# Patient Record
Sex: FEMALE | Race: BLACK OR AFRICAN AMERICAN | Employment: OTHER | URBAN - METROPOLITAN AREA
[De-identification: names, ages, dates, MRNs, and addresses within clinical notes are randomized per-mention and may not be internally consistent; named-entity substitution may affect disease eponyms.]

---

## 2018-06-06 ENCOUNTER — TELEPHONE (OUTPATIENT)
Dept: NEUROLOGY | Age: 63
End: 2018-06-06

## 2018-06-06 ENCOUNTER — OFFICE VISIT (OUTPATIENT)
Dept: NEUROLOGY | Age: 63
End: 2018-06-06
Payer: MEDICARE

## 2018-06-06 VITALS
SYSTOLIC BLOOD PRESSURE: 115 MMHG | OXYGEN SATURATION: 98 % | WEIGHT: 211 LBS | HEIGHT: 67 IN | DIASTOLIC BLOOD PRESSURE: 78 MMHG | HEART RATE: 77 BPM | BODY MASS INDEX: 33.12 KG/M2

## 2018-06-06 DIAGNOSIS — S09.90XA INJURY OF HEAD, INITIAL ENCOUNTER: ICD-10-CM

## 2018-06-06 DIAGNOSIS — G43.009 MIGRAINE WITHOUT AURA AND WITHOUT STATUS MIGRAINOSUS, NOT INTRACTABLE: Primary | ICD-10-CM

## 2018-06-06 DIAGNOSIS — G50.1 ATYPICAL FACIAL PAIN: ICD-10-CM

## 2018-06-06 PROCEDURE — 99214 OFFICE O/P EST MOD 30 MIN: CPT | Performed by: CLINICAL NURSE SPECIALIST

## 2018-06-06 RX ORDER — LORAZEPAM 0.5 MG/1
TABLET ORAL PRN
Refills: 1 | COMMUNITY
Start: 2018-05-15

## 2018-06-06 RX ORDER — PREGABALIN 200 MG/1
200 CAPSULE ORAL 2 TIMES DAILY
Qty: 60 CAPSULE | Refills: 5 | Status: SHIPPED | OUTPATIENT
Start: 2018-06-06 | End: 2018-06-08 | Stop reason: SDUPTHER

## 2018-06-06 RX ORDER — SUMATRIPTAN 100 MG/1
100 TABLET, FILM COATED ORAL
Qty: 6 TABLET | Refills: 2 | Status: SHIPPED | OUTPATIENT
Start: 2018-06-06 | End: 2020-07-22 | Stop reason: SDUPTHER

## 2018-06-08 DIAGNOSIS — S09.90XA INJURY OF HEAD, INITIAL ENCOUNTER: ICD-10-CM

## 2018-06-08 DIAGNOSIS — G50.1 ATYPICAL FACIAL PAIN: ICD-10-CM

## 2018-06-08 RX ORDER — PREGABALIN 200 MG/1
200 CAPSULE ORAL 2 TIMES DAILY
Qty: 60 CAPSULE | Refills: 5 | Status: SHIPPED | OUTPATIENT
Start: 2018-06-08 | End: 2018-09-20 | Stop reason: SDUPTHER

## 2018-09-20 ENCOUNTER — OFFICE VISIT (OUTPATIENT)
Dept: NEUROLOGY | Age: 63
End: 2018-09-20
Payer: MEDICARE

## 2018-09-20 VITALS
SYSTOLIC BLOOD PRESSURE: 113 MMHG | OXYGEN SATURATION: 97 % | TEMPERATURE: 98 F | DIASTOLIC BLOOD PRESSURE: 70 MMHG | HEIGHT: 67 IN | HEART RATE: 75 BPM | BODY MASS INDEX: 35.06 KG/M2 | WEIGHT: 223.4 LBS

## 2018-09-20 DIAGNOSIS — G50.1 ATYPICAL FACIAL PAIN: Primary | ICD-10-CM

## 2018-09-20 DIAGNOSIS — G43.009 MIGRAINE WITHOUT AURA AND WITHOUT STATUS MIGRAINOSUS, NOT INTRACTABLE: ICD-10-CM

## 2018-09-20 PROCEDURE — 99214 OFFICE O/P EST MOD 30 MIN: CPT | Performed by: CLINICAL NURSE SPECIALIST

## 2018-09-20 RX ORDER — PREGABALIN 200 MG/1
200 CAPSULE ORAL 2 TIMES DAILY
Qty: 60 CAPSULE | Refills: 2 | Status: SHIPPED | OUTPATIENT
Start: 2018-09-20 | End: 2019-02-19 | Stop reason: SDUPTHER

## 2018-12-18 ENCOUNTER — TELEPHONE (OUTPATIENT)
Dept: NEUROLOGY | Age: 63
End: 2018-12-18

## 2018-12-18 RX ORDER — BACLOFEN 10 MG/1
10 TABLET ORAL 2 TIMES DAILY
Qty: 60 TABLET | Refills: 3 | Status: CANCELLED | OUTPATIENT
Start: 2018-12-18

## 2018-12-18 NOTE — TELEPHONE ENCOUNTER
Patient called in stating that Moustapha Alexandra treats her for trigeminal neuralgia and she takes Lyrica 200 mg BID. She states that the Lyrica is not working like it has been. She has been taking it for over 5 years. She is starting to feel more pain. She states that She feels like someone punched her in her ear and it radiates. She stated that it starts at her temple and radiates to the front of her ear, goes into her jaw line and into her chin. She states that she wants to get a handle on it before it gets worse. She stated that she really didn't want to go to the ER for this. MA explained to her that Jon Will is out until 12/27/2018 but that this would be forwarded to Dr. Roseann Monahan for immediate advisement. She can be reached at [phone].

## 2018-12-20 RX ORDER — DOXEPIN HYDROCHLORIDE 10 MG/1
10 CAPSULE ORAL NIGHTLY
Qty: 30 CAPSULE | Refills: 5 | Status: SHIPPED | OUTPATIENT
Start: 2018-12-20 | End: 2019-06-24 | Stop reason: SDUPTHER

## 2018-12-20 NOTE — TELEPHONE ENCOUNTER
Patient would like to try the doxepin. Order just needs to be signed. This will be going to [facility] on [address].  Thank you!!

## 2019-02-19 DIAGNOSIS — G50.1 ATYPICAL FACIAL PAIN: ICD-10-CM

## 2019-02-19 RX ORDER — PREGABALIN 200 MG/1
200 CAPSULE ORAL 2 TIMES DAILY
Qty: 60 CAPSULE | Refills: 2 | Status: SHIPPED | OUTPATIENT
Start: 2019-02-19 | End: 2019-04-23 | Stop reason: SDUPTHER

## 2019-02-27 ENCOUNTER — TELEPHONE (OUTPATIENT)
Dept: NEUROLOGY | Age: 64
End: 2019-02-27

## 2019-03-25 ENCOUNTER — OFFICE VISIT (OUTPATIENT)
Dept: NEUROLOGY | Age: 64
End: 2019-03-25
Payer: MEDICARE

## 2019-03-25 VITALS
BODY MASS INDEX: 35.01 KG/M2 | HEART RATE: 85 BPM | WEIGHT: 231 LBS | RESPIRATION RATE: 18 BRPM | SYSTOLIC BLOOD PRESSURE: 126 MMHG | DIASTOLIC BLOOD PRESSURE: 82 MMHG | OXYGEN SATURATION: 99 % | HEIGHT: 68 IN

## 2019-03-25 DIAGNOSIS — I65.23 BILATERAL CAROTID ARTERY STENOSIS: ICD-10-CM

## 2019-03-25 DIAGNOSIS — G08 CEREBRAL VENOUS THROMBOSIS: ICD-10-CM

## 2019-03-25 DIAGNOSIS — G50.1 ATYPICAL FACIAL PAIN: Primary | ICD-10-CM

## 2019-03-25 PROCEDURE — 99214 OFFICE O/P EST MOD 30 MIN: CPT | Performed by: CLINICAL NURSE SPECIALIST

## 2019-03-28 DIAGNOSIS — I65.23 BILATERAL CAROTID ARTERY STENOSIS: ICD-10-CM

## 2019-04-23 DIAGNOSIS — G50.1 ATYPICAL FACIAL PAIN: ICD-10-CM

## 2019-04-23 RX ORDER — PREGABALIN 200 MG/1
200 CAPSULE ORAL 2 TIMES DAILY
Qty: 60 CAPSULE | Refills: 5 | Status: SHIPPED | OUTPATIENT
Start: 2019-04-23 | End: 2019-08-27 | Stop reason: SDUPTHER

## 2019-06-24 RX ORDER — DOXEPIN HYDROCHLORIDE 10 MG/1
CAPSULE ORAL
Qty: 30 CAPSULE | Refills: 5 | Status: SHIPPED | OUTPATIENT
Start: 2019-06-24 | End: 2020-01-27 | Stop reason: SDUPTHER

## 2019-08-27 ENCOUNTER — TELEPHONE (OUTPATIENT)
Dept: NEUROLOGY | Age: 64
End: 2019-08-27

## 2019-08-27 DIAGNOSIS — G50.1 ATYPICAL FACIAL PAIN: ICD-10-CM

## 2019-08-27 RX ORDER — PREGABALIN 200 MG/1
200 CAPSULE ORAL 2 TIMES DAILY
Qty: 60 CAPSULE | Refills: 5 | Status: SHIPPED | OUTPATIENT
Start: 2019-08-27 | End: 2019-09-26 | Stop reason: SDUPTHER

## 2019-08-27 NOTE — TELEPHONE ENCOUNTER
Per Ace patient had a script from [facility] with 5 refills. This was picked up on 8/26 with 1 refill left.   Electronically signed by Ethan Feng on 8/27/19 at 9:47 AM

## 2019-09-26 DIAGNOSIS — G50.1 ATYPICAL FACIAL PAIN: ICD-10-CM

## 2019-09-26 RX ORDER — PREGABALIN 200 MG/1
CAPSULE ORAL
Qty: 60 CAPSULE | Refills: 2 | Status: SHIPPED | OUTPATIENT
Start: 2019-09-26 | End: 2019-10-02 | Stop reason: SDUPTHER

## 2019-10-02 ENCOUNTER — OFFICE VISIT (OUTPATIENT)
Dept: NEUROLOGY | Age: 64
End: 2019-10-02
Payer: MEDICARE

## 2019-10-02 VITALS
HEIGHT: 68 IN | RESPIRATION RATE: 18 BRPM | BODY MASS INDEX: 33.45 KG/M2 | HEART RATE: 73 BPM | OXYGEN SATURATION: 98 % | DIASTOLIC BLOOD PRESSURE: 67 MMHG | SYSTOLIC BLOOD PRESSURE: 117 MMHG | WEIGHT: 220.7 LBS

## 2019-10-02 DIAGNOSIS — G50.1 ATYPICAL FACIAL PAIN: Primary | ICD-10-CM

## 2019-10-02 PROCEDURE — 99214 OFFICE O/P EST MOD 30 MIN: CPT | Performed by: CLINICAL NURSE SPECIALIST

## 2019-10-02 RX ORDER — PREGABALIN 200 MG/1
CAPSULE ORAL
Qty: 60 CAPSULE | Refills: 2 | Status: SHIPPED | OUTPATIENT
Start: 2019-10-02 | End: 2020-04-08 | Stop reason: SDUPTHER

## 2020-01-22 ENCOUNTER — TELEPHONE (OUTPATIENT)
Dept: NEUROLOGY | Age: 65
End: 2020-01-22

## 2020-01-22 NOTE — TELEPHONE ENCOUNTER
ANÍBAL to call and set up appointment w/[facility] in [place] for April  Electronically signed by Rick Khan on 1/22/20 at 10:29 AM

## 2020-01-27 RX ORDER — DOXEPIN HYDROCHLORIDE 10 MG/1
CAPSULE ORAL
Qty: 30 CAPSULE | Refills: 5 | Status: SHIPPED | OUTPATIENT
Start: 2020-01-27 | End: 2020-07-22 | Stop reason: SDUPTHER

## 2020-04-08 ENCOUNTER — VIRTUAL VISIT (OUTPATIENT)
Dept: NEUROLOGY | Age: 65
End: 2020-04-08
Payer: MEDICARE

## 2020-04-08 PROCEDURE — 99443 PR PHYS/QHP TELEPHONE EVALUATION 21-30 MIN: CPT | Performed by: CLINICAL NURSE SPECIALIST

## 2020-04-08 RX ORDER — ATORVASTATIN CALCIUM 40 MG/1
1 TABLET, FILM COATED ORAL DAILY
COMMUNITY
Start: 2020-04-05

## 2020-04-08 RX ORDER — PREGABALIN 200 MG/1
CAPSULE ORAL
Qty: 60 CAPSULE | Refills: 2 | Status: SHIPPED | OUTPATIENT
Start: 2020-04-08 | End: 2020-07-22 | Stop reason: SDUPTHER

## 2020-04-08 NOTE — PROGRESS NOTES
Tati Guerra was read the following message We want to confirm that, for purposes of billing, this is a virtual visit with your provider for which we will submit a claim for reimbursement with your insurance company. You will be responsible for any copays, coinsurance amounts or other amounts not covered by your insurance company. If you do not accept this, unfortunately we will not be able to schedule a virtual visit with the provider. Do you accept? Helga responded Yes .

## 2020-04-08 NOTE — PROGRESS NOTES
Magen Garcia is a 60 y.o. female evaluated via telephone on 4/8/2020. Consent:  She and/or health care decision maker is aware that that she may receive a bill for this telephone service, depending on her insurance coverage, and has provided verbal consent to proceed: Yes    I affirm this is a Patient Initiated Episode with an Established Patient who has not had a related appointment within my department in the past 7 days or scheduled within the next 24 hours. Total Time: minutes: 21-30 minutes      Patient advised regarding steps to help prevent the spread of COVID-19   SOURCE - [email or web address].[email or web address] html     1-Stay home except to get medical care  2-Clean your hands often for atleast 20 secnds, avoid touching: Avoid touching your eyes, nose, and mouth with unwashed hands. 3-Seek medical attention: Seek prompt medical attention if your illness is worsening (e.g., difficulty breathing).   Call you doctor first.  3-Wear a facemask if you are sick   4-Cover your coughs and sneezes          Note: not billable if this call serves to triage the patient into an appointment for the relevant concern  Date of Telephone Visit:   4/8/20     Consent:  The patient and/or health care decision maker is aware that that he may receive a bill for this telephone service, depending on his insurance coverage, and has provided verbal consent to proceed: Yes    Long history of atypical facial pain following a bout with trigeminal neuralgia   A presumed infection triggering her issues   Tried gabapentin and baclofen without improvement   Responded to Lyrica at high doses quite well and recently, Doxepin (per Dr Rubia Luis)      In June 2016 she had the flu for about 3 days   Shortly afterwards, developed severe headache -- seen in ER, diagnosed with CVT   Transferred to [facility] -- underwent clot retrieval and recovered well    Felt she was severely dehydrated from influenza    Left with short-term memory deficits and now no longer working at the [employer]     denies visual issues - no double vision or loss of vision   No nausea or vomiting    Remains off antiplatelets or AC   Was previously started on warfarin -- developed bleeding and it was discontinued by [facility]    Now on daily baby Aspirin     No chest pain or palpitations  No SOB  No vertigo, lightheadedness or loss of consciousness  No falls, tripping or stumbling  No incontinence of bowels or bladder  No itching or bruising appreciated  No numbness, tingling or focal arm/leg weakness    ROS otherwise negative     Prior to Visit Medications    Medication Sig Taking? Authorizing Provider   doxepin (SINEQUAN) 10 MG capsule TAKE 1 CAPSULE BY MOUTH EVERY NIGHT  TERENCE Zapata   pregabalin (LYRICA) 200 MG capsule TAKE 1 CAPSULE BY MOUTH TWICE DAILY  TERENCE Zapata   LORazepam (ATIVAN) 0.5 MG tablet TK 1 T PO TID PRF ANXIETY  Historical Provider, MD   SUMAtriptan (IMITREX) 100 MG tablet Take 1 tablet by mouth once as needed for Migraine  TERENCE Zapata   pseudoephedrine (SUDAFED) 30 MG tablet Take 30 mg by mouth every 4 hours as needed for Congestion  Historical Provider, MD   aspirin 81 MG tablet Take 81 mg by mouth daily  Historical Provider, MD JACOBO VITAMIN D-3 2000 UNITS CAPS 1 capsule daily   Historical Provider, MD   DYMISTA 137-50 MCG/ACT SUSP as needed   Historical Provider, MD   sucralfate (CARAFATE) 1 GM tablet   Take 1 g by mouth as needed   Historical Provider, MD   furosemide (LASIX) 40 MG tablet Take 40 mg by mouth as needed.     Historical Provider, MD     Allergies as of 04/08/2020    (No Known Allergies)     Objective:     Mental Status: alert and oriented to person place and time    Speech: clear  Language: normal with no anomia today       Laboratory/Radiology:     CBC:   Lab Results   Component Value Date    WBC 6.4 11/10/2016    RBC 5.09 11/10/2016    HGB 13.6 11/10/2016    HCT 42.1 11/10/2016    MCV 82.9 11/10/2016    MCH 26.8 11/10/2016    MCHC 32.4 11/10/2016    RDW 15.8 11/10/2016     11/10/2016    MPV 9.5 11/10/2016     BMP:    Lab Results   Component Value Date     11/10/2016    K 4.3 11/10/2016     11/10/2016    CO2 21 11/10/2016    BUN 9 11/10/2016    LABALBU 4.2 11/10/2016    LABALBU 4.0 04/27/2011    CREATININE 0.8 11/10/2016    CALCIUM 9.1 11/10/2016    GFRAA >60 11/10/2016    LABGLOM >60 11/10/2016    GLUCOSE 85 11/10/2016    GLUCOSE 80 04/27/2011     CT Head June 2018    Unremarkable     Carotid US March 2019   No significant stenosis     Labs and dx test personally reviewed at this appt     Assessment:     Atypical facial pain   Improved with Lyrica at current dosage and Doxepin     Gabapentin and baclofen was not helpful in the past    Migraine headache -- rare    Sleep deprivation   May need to consider r/o CYNDEE in future - states previous sleep studies unremarkable     clot retrieval for CVT   Doing well neurologically with minimal cognitive, word-finding issues     Plan:     Continue Lyrica 200 BID and Doxepin at 10mg daily   New script provided     I spent 25 minutes with the patient, with 50% or more counseling them on their diagnosis and treatment. Patient advised regarding steps to help prevent the spread of COVID-19   SOURCE - https://heriberto-amie.info/. html     1-Stay home except to get medical care  2-Clean your hands often for atleast 20 secnds, avoid touching: Avoid touching your eyes, nose, and mouth with unwashed hands. 3-Seek medical attention: Seek prompt medical attention if your illness is worsening (e.g., difficulty breathing).   Call you doctor first.  3-Wear a facemask if you are sick   4-Cover your coughs and sneezes        I affirm this is a Patient Initiated Episode with an Established Patient who has not had a related appointment within my department in the past 7 days or scheduled within the

## 2020-07-22 RX ORDER — PREGABALIN 200 MG/1
CAPSULE ORAL
Qty: 60 CAPSULE | Refills: 5 | Status: SHIPPED
Start: 2020-07-22 | End: 2020-08-18 | Stop reason: SDUPTHER

## 2020-07-22 RX ORDER — SUMATRIPTAN 100 MG/1
100 TABLET, FILM COATED ORAL
Qty: 6 TABLET | Refills: 2 | Status: SHIPPED
Start: 2020-07-22 | End: 2021-08-25

## 2020-07-22 RX ORDER — DOXEPIN HYDROCHLORIDE 10 MG/1
CAPSULE ORAL
Qty: 30 CAPSULE | Refills: 5 | Status: SHIPPED
Start: 2020-07-22 | End: 2021-01-04

## 2020-07-26 ENCOUNTER — HOSPITAL ENCOUNTER (INPATIENT)
Age: 65
LOS: 2 days | Discharge: HOME | DRG: 292 | End: 2020-07-28
Attending: EMERGENCY MEDICINE | Admitting: FAMILY MEDICINE
Payer: MEDICARE

## 2020-07-26 ENCOUNTER — APPOINTMENT (OUTPATIENT)
Dept: GENERAL RADIOLOGY | Age: 65
DRG: 292 | End: 2020-07-26
Payer: MEDICARE

## 2020-07-26 PROBLEM — J44.9 COPD (CHRONIC OBSTRUCTIVE PULMONARY DISEASE) (HCC): Chronic | Status: ACTIVE | Noted: 2020-07-26

## 2020-07-26 PROBLEM — R06.00 DYSPNEA: Status: ACTIVE | Noted: 2020-07-26

## 2020-07-26 PROBLEM — I50.9 ACUTE CONGESTIVE HEART FAILURE (HCC): Status: ACTIVE | Noted: 2020-07-26

## 2020-07-26 PROBLEM — E78.5 HYPERLIPEMIA: Chronic | Status: ACTIVE | Noted: 2020-07-26

## 2020-07-26 LAB
ANION GAP SERPL CALCULATED.3IONS-SCNC: 12 MMOL/L (ref 7–16)
BASOPHILS ABSOLUTE: 0 E9/L (ref 0–0.2)
BASOPHILS RELATIVE PERCENT: 0 % (ref 0–2)
BUN BLDV-MCNC: 11 MG/DL (ref 8–23)
CALCIUM SERPL-MCNC: 9.5 MG/DL (ref 8.6–10.2)
CHLORIDE BLD-SCNC: 105 MMOL/L (ref 98–107)
CO2: 26 MMOL/L (ref 22–29)
CREAT SERPL-MCNC: 0.6 MG/DL (ref 0.5–1)
EOSINOPHILS ABSOLUTE: 0 E9/L (ref 0.05–0.5)
EOSINOPHILS RELATIVE PERCENT: 0 % (ref 0–6)
GFR AFRICAN AMERICAN: >60
GFR NON-AFRICAN AMERICAN: >60 ML/MIN/1.73
GLUCOSE BLD-MCNC: 119 MG/DL (ref 74–99)
HCT VFR BLD CALC: 41.6 % (ref 34–48)
HEMOGLOBIN: 12.7 G/DL (ref 11.5–15.5)
IMMATURE GRANULOCYTES #: 0.01 E9/L
IMMATURE GRANULOCYTES %: 0.2 % (ref 0–5)
LYMPHOCYTES ABSOLUTE: 1.07 E9/L (ref 1.5–4)
LYMPHOCYTES RELATIVE PERCENT: 18.5 % (ref 20–42)
MCH RBC QN AUTO: 25.7 PG (ref 26–35)
MCHC RBC AUTO-ENTMCNC: 30.5 % (ref 32–34.5)
MCV RBC AUTO: 84 FL (ref 80–99.9)
MONOCYTES ABSOLUTE: 0.32 E9/L (ref 0.1–0.95)
MONOCYTES RELATIVE PERCENT: 5.5 % (ref 2–12)
NEUTROPHILS ABSOLUTE: 4.37 E9/L (ref 1.8–7.3)
NEUTROPHILS RELATIVE PERCENT: 75.8 % (ref 43–80)
PDW BLD-RTO: 14.5 FL (ref 11.5–15)
PLATELET # BLD: 252 E9/L (ref 130–450)
PMV BLD AUTO: 11.5 FL (ref 7–12)
POTASSIUM REFLEX MAGNESIUM: 3.7 MMOL/L (ref 3.5–5)
PRO-BNP: 336 PG/ML (ref 0–125)
RBC # BLD: 4.95 E12/L (ref 3.5–5.5)
SARS-COV-2, NAAT: NOT DETECTED
SODIUM BLD-SCNC: 143 MMOL/L (ref 132–146)
TROPONIN: <0.01 NG/ML (ref 0–0.03)
WBC # BLD: 5.8 E9/L (ref 4.5–11.5)

## 2020-07-26 PROCEDURE — 6360000002 HC RX W HCPCS: Performed by: STUDENT IN AN ORGANIZED HEALTH CARE EDUCATION/TRAINING PROGRAM

## 2020-07-26 PROCEDURE — 2060000000 HC ICU INTERMEDIATE R&B

## 2020-07-26 PROCEDURE — 80048 BASIC METABOLIC PNL TOTAL CA: CPT

## 2020-07-26 PROCEDURE — G0378 HOSPITAL OBSERVATION PER HR: HCPCS

## 2020-07-26 PROCEDURE — 96374 THER/PROPH/DIAG INJ IV PUSH: CPT

## 2020-07-26 PROCEDURE — 83880 ASSAY OF NATRIURETIC PEPTIDE: CPT

## 2020-07-26 PROCEDURE — 71045 X-RAY EXAM CHEST 1 VIEW: CPT

## 2020-07-26 PROCEDURE — 85025 COMPLETE CBC W/AUTO DIFF WBC: CPT

## 2020-07-26 PROCEDURE — 93005 ELECTROCARDIOGRAM TRACING: CPT | Performed by: STUDENT IN AN ORGANIZED HEALTH CARE EDUCATION/TRAINING PROGRAM

## 2020-07-26 PROCEDURE — 99285 EMERGENCY DEPT VISIT HI MDM: CPT

## 2020-07-26 PROCEDURE — 94761 N-INVAS EAR/PLS OXIMETRY MLT: CPT

## 2020-07-26 PROCEDURE — U0002 COVID-19 LAB TEST NON-CDC: HCPCS

## 2020-07-26 PROCEDURE — 84484 ASSAY OF TROPONIN QUANT: CPT

## 2020-07-26 RX ORDER — FUROSEMIDE 10 MG/ML
20 INJECTION INTRAMUSCULAR; INTRAVENOUS ONCE
Status: COMPLETED | OUTPATIENT
Start: 2020-07-26 | End: 2020-07-26

## 2020-07-26 RX ADMIN — FUROSEMIDE 20 MG: 10 INJECTION, SOLUTION INTRAVENOUS at 19:36

## 2020-07-26 ASSESSMENT — ENCOUNTER SYMPTOMS
WHEEZING: 0
ABDOMINAL DISTENTION: 0
EYE REDNESS: 0
SHORTNESS OF BREATH: 1
COUGH: 0
SINUS PRESSURE: 0
ABDOMINAL PAIN: 0
SPUTUM PRODUCTION: 0
NAUSEA: 0
EYE PAIN: 0
SORE THROAT: 0
DIARRHEA: 0
VOMITING: 0
EYE DISCHARGE: 0
BACK PAIN: 0

## 2020-07-26 ASSESSMENT — PAIN SCALES - GENERAL: PAINLEVEL_OUTOF10: 0

## 2020-07-26 NOTE — ED NOTES
Spoke with Dr. Dari Verma, patient would like to be updated on plan of care.       Hima Rubin RN  07/26/20 [id number]

## 2020-07-26 NOTE — ED NOTES
Patient was walked around nurses pod 2. Spo2 stated at 96% on RA. And ended at 90% on RA.       Saintclair Rand, LPN  06/16/16 4301

## 2020-07-26 NOTE — ED PROVIDER NOTES
72-year-old female with a history of COPD presents to the emergency department with complaints of shortness of breath. Patient states this is been going on for the past 2 weeks. And that it is worse when she exercises. Patient states she does use an inhaler but it has not helped. Patient will states she has some bilateral swelling to her ankles. Patient states that is been slowly worsening for the past 2 weeks. Denies any fevers cough sore throat denies any chest pain abdominal pain, nausea vomiting, urine or bowel changes. Patient states the shortness of breath does not prevent her from any of her daily activities it is just bothersome. Patient has a past medical history of COPD and high cholesterol. Shortness of Breath   Severity:  Mild  Onset quality:  Gradual  Duration:  2 weeks  Timing:  Intermittent  Progression:  Worsening  Chronicity:  Recurrent  Context: activity    Relieved by:  Nothing  Worsened by: Activity  Ineffective treatments:  Inhaler  Associated symptoms: no abdominal pain, no chest pain, no cough, no diaphoresis, no ear pain, no fever, no headaches, no rash, no sore throat, no sputum production, no vomiting and no wheezing         Review of Systems   Constitutional: Negative for chills, diaphoresis and fever. HENT: Negative for ear pain, sinus pressure and sore throat. Eyes: Negative for pain, discharge and redness. Respiratory: Positive for shortness of breath. Negative for cough, sputum production and wheezing. Cardiovascular: Negative for chest pain. Gastrointestinal: Negative for abdominal distention, abdominal pain, diarrhea, nausea and vomiting. Genitourinary: Negative for dysuria and frequency. Musculoskeletal: Negative for arthralgias and back pain. Skin: Negative for rash and wound. Neurological: Negative for weakness and headaches. Hematological: Negative for adenopathy. All other systems reviewed and are negative.        Physical CPT®: reviewed         ED Course as of Jul 26 2116   Ackerman Jul 26, 2020   1313 Concern usually of the patient's shortness of breath is possible COPD exacerbation versus possible CHF. Will work-up for signs of acute coronary syndrome and CHF and will get chest x-ray to evaluate for possible signs of pneumonia or consolidation    [CB]   1633 Talk to patient about her chest x-ray being unremarkable and labs been unremarkable except for a slight elevation [place]BNP at [address] with patient about doing a walking ambulatory pulse ox check to make sure O2 does not drop and if everything is okay we would be able to discharge her and have her follow-up with her PCP was agreeable with this plan    [CB]   1922 Talked with patient about her ambulatory sat being low and her BNP being elevated and possible CHF patient states she would not be able to follow-up with a cardiologist, I recommend admission for the patient and the patient was agreeable, also ordered 20 of Lasix    [CB]   1930 Talked with Dr. Darlene Kearney on the telephone by admitting the patient he was agreeable    [CB]      ED Course User Index  [CB] Lashanda Guerrier MD        ED Course as of Jul 26 2116   Sun Jul 26, 2020   1313 Concern usually of the patient's shortness of breath is possible COPD exacerbation versus possible CHF.   Will work-up for signs of acute coronary syndrome and CHF and will get chest x-ray to evaluate for possible signs of pneumonia or consolidation    [CB]   1633 Talk to patient about her chest x-ray being unremarkable and labs been unremarkable except for a slight elevation of her proBNP at [address] with patient about doing a walking ambulatory pulse ox check to make sure O2 does not drop and if everything is okay we would be able to discharge her and have her follow-up with her PCP was agreeable with this plan    [CB]   1922 Talked with patient about her ambulatory sat being low and her BNP being elevated and possible CHF patient states she would not be able to follow-up with a cardiologist, I recommend admission for the patient and the patient was agreeable, also ordered 20 of Lasix    [CB]   1930 Talked with Dr. Candida Winston on the telephone by admitting the patient he was agreeable    [CB]      ED Course User Index  [CB] Glory Arango MD       --------------------------------------------- PAST HISTORY ---------------------------------------------  Past Medical History:  has a past medical history of GERD (gastroesophageal reflux disease), History of colonoscopy, and Trigeminal neuralgia. Past Surgical History:  has a past surgical history that includes Dilation & curettage; Cholecystectomy; Tubal ligation; Appendectomy; joint replacement (Left); brain surgery; and Finger trigger release. Social History:  reports that she quit smoking about 16 years ago. She has never used smokeless tobacco. She reports that she does not drink alcohol or use drugs. Family History: family history includes Diabetes in her maternal grandmother; Heart Disease in her father and maternal grandmother; High Cholesterol in her maternal grandmother and mother; Hypertension in her maternal grandmother and mother; Osteoporosis in her maternal grandmother and mother; Stroke in her maternal grandmother; Sudden Death in her mother. The patients home medications have been reviewed. Allergies: Patient has no known allergies.     -------------------------------------------------- RESULTS -------------------------------------------------    LABS:  Results for orders placed or performed during the hospital encounter of 07/26/20   CBC Auto Differential   Result Value Ref Range    WBC 5.8 4.5 - 11.5 E9/L    RBC 4.95 3.50 - 5.50 E12/L    Hemoglobin 12.7 11.5 - 15.5 g/dL    Hematocrit 41.6 34.0 - 48.0 %    MCV 84.0 80.0 - 99.9 fL    MCH 25.7 (L) 26.0 - 35.0 pg    MCHC 30.5 (L) 32.0 - 34.5 %    RDW 14.5 11.5 - 15.0 fL    Platelets 715 763 - 655 E9/L    MPV 11.5 7.0 - 12.0 fL

## 2020-07-27 PROBLEM — R60.9 EDEMA: Status: ACTIVE | Noted: 2020-07-27

## 2020-07-27 LAB
ANION GAP SERPL CALCULATED.3IONS-SCNC: 11 MMOL/L (ref 7–16)
BUN BLDV-MCNC: 10 MG/DL (ref 8–23)
CALCIUM SERPL-MCNC: 9.2 MG/DL (ref 8.6–10.2)
CHLORIDE BLD-SCNC: 108 MMOL/L (ref 98–107)
CO2: 25 MMOL/L (ref 22–29)
CREAT SERPL-MCNC: 0.7 MG/DL (ref 0.5–1)
EKG ATRIAL RATE: 89 BPM
EKG P AXIS: 49 DEGREES
EKG P-R INTERVAL: 132 MS
EKG Q-T INTERVAL: 396 MS
EKG QRS DURATION: 82 MS
EKG QTC CALCULATION (BAZETT): 481 MS
EKG R AXIS: 5 DEGREES
EKG T AXIS: 0 DEGREES
EKG VENTRICULAR RATE: 89 BPM
GFR AFRICAN AMERICAN: >60
GFR NON-AFRICAN AMERICAN: >60 ML/MIN/1.73
GLUCOSE BLD-MCNC: 96 MG/DL (ref 74–99)
LV EF: 68 %
LVEF MODALITY: NORMAL
MAGNESIUM: 1.9 MG/DL (ref 1.6–2.6)
POTASSIUM REFLEX MAGNESIUM: 3.2 MMOL/L (ref 3.5–5)
SODIUM BLD-SCNC: 144 MMOL/L (ref 132–146)

## 2020-07-27 PROCEDURE — 36415 COLL VENOUS BLD VENIPUNCTURE: CPT

## 2020-07-27 PROCEDURE — 2060000000 HC ICU INTERMEDIATE R&B

## 2020-07-27 PROCEDURE — 96372 THER/PROPH/DIAG INJ SC/IM: CPT

## 2020-07-27 PROCEDURE — 6360000004 HC RX CONTRAST MEDICATION: Performed by: FAMILY MEDICINE

## 2020-07-27 PROCEDURE — 94640 AIRWAY INHALATION TREATMENT: CPT

## 2020-07-27 PROCEDURE — APPSS60 APP SPLIT SHARED TIME 46-60 MINUTES: Performed by: PHYSICIAN ASSISTANT

## 2020-07-27 PROCEDURE — 6370000000 HC RX 637 (ALT 250 FOR IP): Performed by: INTERNAL MEDICINE

## 2020-07-27 PROCEDURE — 80048 BASIC METABOLIC PNL TOTAL CA: CPT

## 2020-07-27 PROCEDURE — 99222 1ST HOSP IP/OBS MODERATE 55: CPT | Performed by: INTERNAL MEDICINE

## 2020-07-27 PROCEDURE — 83735 ASSAY OF MAGNESIUM: CPT

## 2020-07-27 PROCEDURE — 6370000000 HC RX 637 (ALT 250 FOR IP): Performed by: FAMILY MEDICINE

## 2020-07-27 PROCEDURE — 93306 TTE W/DOPPLER COMPLETE: CPT

## 2020-07-27 PROCEDURE — 6370000000 HC RX 637 (ALT 250 FOR IP)

## 2020-07-27 PROCEDURE — G0378 HOSPITAL OBSERVATION PER HR: HCPCS

## 2020-07-27 PROCEDURE — 96376 TX/PRO/DX INJ SAME DRUG ADON: CPT

## 2020-07-27 PROCEDURE — 2580000003 HC RX 258: Performed by: FAMILY MEDICINE

## 2020-07-27 PROCEDURE — 6360000002 HC RX W HCPCS: Performed by: FAMILY MEDICINE

## 2020-07-27 RX ORDER — PROMETHAZINE HYDROCHLORIDE 25 MG/1
12.5 TABLET ORAL EVERY 6 HOURS PRN
Status: DISCONTINUED | OUTPATIENT
Start: 2020-07-27 | End: 2020-07-28 | Stop reason: HOSPADM

## 2020-07-27 RX ORDER — LORAZEPAM 0.5 MG/1
0.5 TABLET ORAL EVERY 8 HOURS PRN
Status: DISCONTINUED | OUTPATIENT
Start: 2020-07-27 | End: 2020-07-28 | Stop reason: HOSPADM

## 2020-07-27 RX ORDER — SUCRALFATE 1 G/1
1 TABLET ORAL EVERY 12 HOURS SCHEDULED
Status: DISCONTINUED | OUTPATIENT
Start: 2020-07-27 | End: 2020-07-28 | Stop reason: HOSPADM

## 2020-07-27 RX ORDER — POTASSIUM CHLORIDE 20 MEQ/1
TABLET, EXTENDED RELEASE ORAL
Status: COMPLETED
Start: 2020-07-27 | End: 2020-07-27

## 2020-07-27 RX ORDER — FUROSEMIDE 10 MG/ML
40 INJECTION INTRAMUSCULAR; INTRAVENOUS DAILY
Status: DISCONTINUED | OUTPATIENT
Start: 2020-07-27 | End: 2020-07-27

## 2020-07-27 RX ORDER — DOXEPIN HYDROCHLORIDE 10 MG/1
10 CAPSULE ORAL NIGHTLY
Status: DISCONTINUED | OUTPATIENT
Start: 2020-07-27 | End: 2020-07-28 | Stop reason: HOSPADM

## 2020-07-27 RX ORDER — POLYETHYLENE GLYCOL 3350 17 G/17G
17 POWDER, FOR SOLUTION ORAL DAILY PRN
Status: DISCONTINUED | OUTPATIENT
Start: 2020-07-27 | End: 2020-07-28 | Stop reason: HOSPADM

## 2020-07-27 RX ORDER — SODIUM CHLORIDE 0.9 % (FLUSH) 0.9 %
10 SYRINGE (ML) INJECTION EVERY 12 HOURS SCHEDULED
Status: DISCONTINUED | OUTPATIENT
Start: 2020-07-27 | End: 2020-07-28 | Stop reason: HOSPADM

## 2020-07-27 RX ORDER — ONDANSETRON 2 MG/ML
4 INJECTION INTRAMUSCULAR; INTRAVENOUS EVERY 6 HOURS PRN
Status: DISCONTINUED | OUTPATIENT
Start: 2020-07-27 | End: 2020-07-28 | Stop reason: HOSPADM

## 2020-07-27 RX ORDER — POTASSIUM CHLORIDE 20 MEQ/1
20 TABLET, EXTENDED RELEASE ORAL 2 TIMES DAILY WITH MEALS
Status: DISCONTINUED | OUTPATIENT
Start: 2020-07-27 | End: 2020-07-27

## 2020-07-27 RX ORDER — FAMOTIDINE 20 MG/1
20 TABLET, FILM COATED ORAL 2 TIMES DAILY
Status: DISCONTINUED | OUTPATIENT
Start: 2020-07-27 | End: 2020-07-28 | Stop reason: HOSPADM

## 2020-07-27 RX ORDER — ASPIRIN 81 MG/1
81 TABLET ORAL DAILY
Status: DISCONTINUED | OUTPATIENT
Start: 2020-07-27 | End: 2020-07-28 | Stop reason: HOSPADM

## 2020-07-27 RX ORDER — SUMATRIPTAN 50 MG/1
100 TABLET, FILM COATED ORAL
Status: DISPENSED | OUTPATIENT
Start: 2020-07-27 | End: 2020-07-27

## 2020-07-27 RX ORDER — BUMETANIDE 1 MG/1
1 TABLET ORAL 2 TIMES DAILY
Status: DISCONTINUED | OUTPATIENT
Start: 2020-07-28 | End: 2020-07-28 | Stop reason: HOSPADM

## 2020-07-27 RX ORDER — POTASSIUM CHLORIDE 20 MEQ/1
20 TABLET, EXTENDED RELEASE ORAL
Status: DISCONTINUED | OUTPATIENT
Start: 2020-07-27 | End: 2020-07-28 | Stop reason: HOSPADM

## 2020-07-27 RX ORDER — SODIUM CHLORIDE 0.9 % (FLUSH) 0.9 %
10 SYRINGE (ML) INJECTION PRN
Status: DISCONTINUED | OUTPATIENT
Start: 2020-07-27 | End: 2020-07-28 | Stop reason: HOSPADM

## 2020-07-27 RX ORDER — DIPHENHYDRAMINE HCL 25 MG
25 TABLET ORAL EVERY 6 HOURS PRN
Status: DISCONTINUED | OUTPATIENT
Start: 2020-07-27 | End: 2020-07-28 | Stop reason: HOSPADM

## 2020-07-27 RX ORDER — ACETAMINOPHEN 650 MG/1
650 SUPPOSITORY RECTAL EVERY 6 HOURS PRN
Status: DISCONTINUED | OUTPATIENT
Start: 2020-07-27 | End: 2020-07-28 | Stop reason: HOSPADM

## 2020-07-27 RX ORDER — IPRATROPIUM BROMIDE AND ALBUTEROL SULFATE 2.5; .5 MG/3ML; MG/3ML
1 SOLUTION RESPIRATORY (INHALATION) EVERY 4 HOURS PRN
Status: DISCONTINUED | OUTPATIENT
Start: 2020-07-27 | End: 2020-07-27

## 2020-07-27 RX ORDER — IPRATROPIUM BROMIDE AND ALBUTEROL SULFATE 2.5; .5 MG/3ML; MG/3ML
1 SOLUTION RESPIRATORY (INHALATION) 4 TIMES DAILY
Status: DISCONTINUED | OUTPATIENT
Start: 2020-07-27 | End: 2020-07-28 | Stop reason: HOSPADM

## 2020-07-27 RX ORDER — ACETAMINOPHEN 325 MG/1
650 TABLET ORAL EVERY 6 HOURS PRN
Status: DISCONTINUED | OUTPATIENT
Start: 2020-07-27 | End: 2020-07-28 | Stop reason: HOSPADM

## 2020-07-27 RX ORDER — ATORVASTATIN CALCIUM 40 MG/1
40 TABLET, FILM COATED ORAL DAILY
Status: DISCONTINUED | OUTPATIENT
Start: 2020-07-27 | End: 2020-07-28 | Stop reason: HOSPADM

## 2020-07-27 RX ADMIN — SODIUM CHLORIDE, PRESERVATIVE FREE 10 ML: 5 INJECTION INTRAVENOUS at 20:28

## 2020-07-27 RX ADMIN — ENOXAPARIN SODIUM 40 MG: 40 INJECTION SUBCUTANEOUS at 08:35

## 2020-07-27 RX ADMIN — DOXEPIN HYDROCHLORIDE 10 MG: 10 CAPSULE ORAL at 20:28

## 2020-07-27 RX ADMIN — PREGABALIN 200 MG: 75 CAPSULE ORAL at 20:25

## 2020-07-27 RX ADMIN — FUROSEMIDE 40 MG: 10 INJECTION, SOLUTION INTRAMUSCULAR; INTRAVENOUS at 08:35

## 2020-07-27 RX ADMIN — PREDNISONE 30 MG: 5 TABLET ORAL at 15:48

## 2020-07-27 RX ADMIN — IPRATROPIUM BROMIDE AND ALBUTEROL SULFATE 1 AMPULE: .5; 3 SOLUTION RESPIRATORY (INHALATION) at 21:11

## 2020-07-27 RX ADMIN — PREGABALIN 200 MG: 75 CAPSULE ORAL at 08:35

## 2020-07-27 RX ADMIN — IPRATROPIUM BROMIDE AND ALBUTEROL SULFATE 1 AMPULE: .5; 3 SOLUTION RESPIRATORY (INHALATION) at 17:23

## 2020-07-27 RX ADMIN — SODIUM CHLORIDE, PRESERVATIVE FREE 10 ML: 5 INJECTION INTRAVENOUS at 08:35

## 2020-07-27 RX ADMIN — POTASSIUM CHLORIDE 20 MEQ: 20 TABLET, EXTENDED RELEASE ORAL at 11:09

## 2020-07-27 RX ADMIN — POTASSIUM CHLORIDE 20 MEQ: 20 TABLET, EXTENDED RELEASE ORAL at 08:34

## 2020-07-27 RX ADMIN — ATORVASTATIN CALCIUM 40 MG: 40 TABLET, FILM COATED ORAL at 08:35

## 2020-07-27 RX ADMIN — POTASSIUM CHLORIDE 20 MEQ: 20 TABLET, EXTENDED RELEASE ORAL at 15:48

## 2020-07-27 RX ADMIN — SUCRALFATE 1 G: 1 TABLET ORAL at 20:29

## 2020-07-27 RX ADMIN — ASPIRIN 81 MG: 81 TABLET, COATED ORAL at 08:35

## 2020-07-27 RX ADMIN — PERFLUTREN 1.65 MG: 6.52 INJECTION, SUSPENSION INTRAVENOUS at 09:55

## 2020-07-27 ASSESSMENT — PAIN SCALES - GENERAL
PAINLEVEL_OUTOF10: 0

## 2020-07-27 NOTE — H&P
Catrachito Sotomayor History and Physical      CHIEF COMPLAINT:  Shortness of breath with walking    History Obtained From:  Patient    HISTORY OF PRESENT ILLNESS:    The patient is a 60 y.o. female with significant past medical history of COPD/asthma who presents with the onset of shortness of breath which she noted 2 weeks ago when she walks for exercise outside. She has noted increased swelling of her ankles over the past 2 weeks also. She states she has had no cough, no fever, no orthopnea, no palpitations, no syncope. She denies chest pain with exertion. She states she has been getting hives this summer and is itchy again this morning. Past Medical History:     has a past medical history of GERD (gastroesophageal reflux disease), History of colonoscopy (2006), and Trigeminal neuralgia. Past Surgical History:     has a past surgical history that includes Dilation & curettage; Cholecystectomy; Tubal ligation; Appendectomy; joint replacement (Left); brain surgery; and Finger trigger release. Medications Prior to Admission:    Medications Prior to Admission: pregabalin (LYRICA) 200 MG capsule, TAKE 1 CAPSULE BY MOUTH TWICE DAILY  doxepin (SINEQUAN) 10 MG capsule, TAKE 1 CAPSULE BY MOUTH EVERY NIGHT  atorvastatin (LIPITOR) 40 MG tablet, Take 1 tablet by mouth daily  LORazepam (ATIVAN) 0.5 MG tablet, as needed. pseudoephedrine (SUDAFED) 30 MG tablet, Take 30 mg by mouth every 4 hours as needed for Congestion  aspirin 81 MG tablet, Take 81 mg by mouth daily  RA VITAMIN D-3 2000 UNITS CAPS, 1 capsule daily   DYMISTA 137-50 MCG/ACT SUSP, as needed   furosemide (LASIX) 40 MG tablet, Take 40 mg by mouth as needed. SUMAtriptan (IMITREX) 100 MG tablet, Take 1 tablet by mouth once as needed for Migraine  [DISCONTINUED] sucralfate (CARAFATE) 1 GM tablet,  Take 1 g by mouth as needed     Allergies:  Patient has no known allergies. Social History:    reports that she quit smoking about 16 years ago.  She has never clear to auscultation bilaterally, no crackles or wheezing  CARDIOVASCULAR:  Normal apical impulse, regular rate and rhythm, normal S1 and S2, no S3 or S4, and no murmur noted. There is 2 plus pitting edema of both feet and ankles  ABDOMEN:  Flat, soft, non-tender, no HSM, no masses  CHEST/BREASTS:  No chest tenderness  GENITAL/URINARY:  deferred  MUSCULOSKELETAL:  There is no redness, warmth, or swelling of the joints. Full range of motion noted. Motor strength is 5 out of 5 all extremities bilaterally. Tone is normal.  NEUROLOGIC:  Awake, alert, oriented to name, place and time. Cranial nerves II-XII are grossly intact. Motor is 5 out of 5 bilaterally. Cerebellar finger to nose, heel to shin intact. Sensory is intact.   Babinski down going, Romberg negative, and gait is normal.  SKIN:  no rashes    DATA:  EKG:    CBC with Differential:    Lab Results   Component Value Date    WBC 5.8 07/26/2020    RBC 4.95 07/26/2020    HGB 12.7 07/26/2020    HCT 41.6 07/26/2020     07/26/2020    MCV 84.0 07/26/2020    MCH 25.7 07/26/2020    MCHC 30.5 07/26/2020    RDW 14.5 07/26/2020    SEGSPCT 49 04/05/2013    LYMPHOPCT 18.5 07/26/2020    MONOPCT 5.5 07/26/2020    BASOPCT 0.0 07/26/2020    MONOSABS 0.32 07/26/2020    LYMPHSABS 1.07 07/26/2020    EOSABS 0.00 07/26/2020    BASOSABS 0.00 07/26/2020     CMP:    Lab Results   Component Value Date     07/27/2020    K 3.2 07/27/2020     07/27/2020    CO2 25 07/27/2020    BUN 10 07/27/2020    CREATININE 0.7 07/27/2020    GFRAA >60 07/27/2020    LABGLOM >60 07/27/2020    GLUCOSE 96 07/27/2020    GLUCOSE 80 04/27/2011    PROT 7.7 11/10/2016    LABALBU 4.2 11/10/2016    LABALBU 4.0 04/27/2011    CALCIUM 9.2 07/27/2020    BILITOT 0.3 11/10/2016    ALKPHOS 66 11/10/2016    AST 12 11/10/2016    ALT 9 11/10/2016     PT/INR:    Lab Results   Component Value Date    PROTIME 41.3 06/08/2015    INR 3.8 06/08/2015     Last 3 Troponin:    Lab Results   Component Value Date TROPONINI <0.01 07/26/2020    TROPONINI <0.01 05/22/2015    CKTOTAL 128 05/22/2015    CKMB 1.1 05/22/2015     TSH:    Lab Results   Component Value Date    TSH 1.020 07/18/2016     Radiology Review:  CXR with pulmonary hyperinflation    ASSESSMENT AND PLAN:    Patient Active Problem List    Diagnosis Date Noted    Edema 07/27/2020     Priority: High    Acute congestive heart failure ([facility] 07/26/2020     Priority: High    [facility] 07/26/2020     Priority: High    Dyspnea 07/26/2020     Priority: High    Hyperlipemia 07/26/2020     Priority: Low    Cavernous sinus thrombosis 05/23/2015         Admit the patient. Echocardiogram and cardiology and pulmonology consultation to help clarify the cause of dyspnea and edema and to determine if CHF is present. Continue with diuretic for now. Replace potassium.

## 2020-07-27 NOTE — CONSULTS
contacts. She denies paroxysmal nocturnal dyspnea or orthopnea. She denies any personal history of coronary artery disease, myocardial infarction, heart failure, cardiac arrhythmia or valvular heart disease. She does not follow regularly with a cardiologist. She does note of having a stress test and echocardiogram approximately twelve years ago -- she states she was told both tests resulted normal with no need for further evaluation. She states her edema bilaterally is now back to baseline and she feels her symptoms have improved since admission. She is a former tobacco smoker, quit 18 years ago. States she smoked for 35+ years, two ppd. She denies former or current alcohol or illicit drug use. Father and maternal grandmother with history of CHF. She denies any further pertinent cardiac family medical history at this time. Labs and diagnostic testing as noted below. Please note: past medical records were reviewed per electronic medical record (EMR) - see detailed reports under Past Medical/ Surgical History. PAST MEDICAL HISTORY:    1. Anxiety. 2. Obesity. 3. Gastroesophageal reflux disease. 4. Hyperlipidemia, on statin therapy. 5. History of trigeminal neuralgia. Follows with neurology. 6. History of cerebral venous thrombosis s/p clot retrieval at [facility] in 2016. Follows with neurology. 7. Vitamin D deficiency. 8. Former tobacco abuse. 9. Chronic obstructive pulmonary disease/asthma. PAST SURGICAL HISTORY:    Past Surgical History:   Procedure Laterality Date    APPENDECTOMY      1972    BRAIN SURGERY      CHOLECYSTECTOMY      1996    DILATION AND CURETTAGE      9/07    FINGER TRIGGER RELEASE      JOINT REPLACEMENT Left     Left hip    TUBAL LIGATION      1986       HOME MEDICATIONS:  Prior to Admission medications    Medication Sig Start Date End Date Taking?  Authorizing Provider   pregabalin (LYRICA) 200 MG capsule TAKE 1 CAPSULE BY MOUTH TWICE DAILY 7/22/20 1/27/21 Yes Huma Up. Kwan, APRN - CNS   doxepin (SINEQUAN) 10 MG capsule TAKE 1 CAPSULE BY MOUTH EVERY NIGHT 7/22/20  Yes TERENCE Cloud   atorvastatin (LIPITOR) 40 MG tablet Take 1 tablet by mouth daily 4/5/20  Yes Historical Provider, MD   LORazepam (ATIVAN) 0.5 MG tablet as needed. 5/15/18  Yes Historical Provider, MD   pseudoephedrine (SUDAFED) 30 MG tablet Take 30 mg by mouth every 4 hours as needed for Congestion   Yes Historical Provider, MD   aspirin 81 MG tablet Take 81 mg by mouth daily   Yes Historical Provider, MD JACOBO VITAMIN D-3 2000 UNITS CAPS 1 capsule daily  5/25/16  Yes Historical Provider, MD   DYMISTA 137-50 MCG/ACT SUSP as needed  5/31/16  Yes Historical Provider, MD   furosemide (LASIX) 40 MG tablet Take 40 mg by mouth as needed.      Yes Historical Provider, MD   SUMAtriptan (IMITREX) 100 MG tablet Take 1 tablet by mouth once as needed for Migraine 7/22/20 7/22/20  TERENCE Cloud       CURRENT MEDICATIONS:      Current Facility-Administered Medications:     aspirin EC tablet 81 mg, 81 mg, Oral, Daily, Martinez Lang MD    atorvastatin (LIPITOR) tablet 40 mg, 40 mg, Oral, Daily, Martinez Lang MD    doxepin (SINEQUAN) capsule 10 mg, 10 mg, Oral, Nightly, Martinez Lang MD    LORazepam (ATIVAN) tablet 0.5 mg, 0.5 mg, Oral, Q8H PRN, Martinez Lang MD    pregabalin (LYRICA) capsule 200 mg, 200 mg, Oral, BID, Martinez Lang MD    sucralfate (CARAFATE) tablet 1 g, 1 g, Oral, 2 times per day, Martinez Lang MD    SUMAtriptan [facility]) tablet 100 mg, 100 mg, Oral, Once PRN, Martinez Lang MD    sodium chloride flush 0.9 % injection 10 mL, 10 mL, Intravenous, 2 times per day, Martinez Lang MD    sodium chloride flush 0.9 % injection 10 mL, 10 mL, Intravenous, PRN, Martinez Lang MD    acetaminophen (TYLENOL) tablet 650 mg, 650 mg, Oral, Q6H PRN **OR** acetaminophen (TYLENOL) suppository 650 mg, 650 mg, Rectal, Q6H PRN, Martinez Lang MD    polyethylene glycol [facility]) packet 17 g, 17 g, Oral, Daily PRN, Marquise Malhotra MD    promethazine (PHENERGAN) tablet 12.5 mg, 12.5 mg, Oral, Q6H PRN **OR** ondansetron (ZOFRAN) injection 4 mg, 4 mg, Intravenous, Q6H PRN, Marquise Malhotra MD    enoxaparin (LOVENOX) injection 40 mg, 40 mg, Subcutaneous, Daily, Marquise Malhotra MD    famotidine (PEPCID) tablet 20 mg, 20 mg, Oral, BID, Marquise Malhotra MD    potassium chloride (KLOR-CON M) extended release tablet 20 mEq, 20 mEq, Oral, BID WC, Marquise Malhotra MD    furosemide (LASIX) injection 40 mg, 40 mg, Intravenous, Daily, Marquise Malhotra MD    ipratropium-albuterol (DUONEB) nebulizer solution 1 ampule, 1 ampule, Inhalation, Q4H PRN, Marquise Malhotra MD    perflutren lipid microspheres (DEFINITY) injection 1.65 mg, 1.5 mL, Intravenous, ONCE PRN, Marquise Malhotra MD      ALLERGIES:  Patient has no known allergies. SOCIAL HISTORY:    She is a former tobacco smoker, quit 18 years ago. States she smoked for 35+ years, two ppd. She denies former or current alcohol or illicit drug use. FAMILY HISTORY:   Father and maternal grandmother with history of CHF. She denies any further pertinent cardiac family medical history at this time. REVIEW OF SYSTEMS:     · Constitutional: Denies fevers, chills, night sweats, and generalized fatigue. Denies significant weight loss or weight gain. · HEENT: Denies headaches, nose bleeds, rhinorrhea, sore throat. Denies blurred vision. Denies dysphagia, odynophagia. · Musculoskeletal: Denies falls, pain to BLE with ambulation. Denies muscle weakness. · Neurological: Denies dizziness and lightheadedness, numbness and tingling. Denies focal neurological deficits. · Cardiovascular: +rare palpitations, +peripheral edema. Denies chest pain, diaphoresis. Denies syncope. Denies PND, orthopnea. · Respiratory: +dyspnea on exertion. Denies cough, hemoptysis.   · Gastrointestinal: Denies abdominal pain, nausea/vomiting, diarrhea and constipation, black/bloody, and tarry stools. · Genitourinary: Denies dysuria and hematuria. · Hematologic: Denies excessive bruising or bleeding. · Endocrine: Denies excessive thirst. Denies intolerance to hot and cold. · Psychiatric: +anxiety. PHYSICAL EXAM:   /68   Pulse 81   Temp 98.7 °F (37.1 °C) (Temporal)   Resp 16   Ht 5' 8\" (1.727 m)   Wt 250 lb 3.2 oz (113.5 kg)   SpO2 95%   BMI 38.04 kg/m²   CONST:  Well developed, obese AAF who appears stated age. Awake, alert, cooperative, no apparent distress. HEENT:   Head- Normocephalic, atraumatic. Eyes- Conjunctivae pink, anicteric. Throat- Oral mucosa pink and moist.  Neck-  No stridor, trachea midline, no apparent jugular venous distention. CHEST: Chest symmetrical and non-tender to palpation. No accessory muscle use or intercostal retractions. RESPIRATORY: Lung sounds - clear throughout fields. No wheezing, rales or rhonchi. Diminished. CARDIOVASCULAR:     No carotid bruit. Heart Inspection- shows no noted pulsations. Heart Palpation- no heaves or thrills. Heart Ausculation- Regular rate and rhythm, soft systolic murmur RUSB, LUSB. No s3, s4 or rub. PV: 1+ bilateral lower extremity edema. No varicosities. Pedal pulses palpable, no clubbing or cyanosis. ABDOMEN: Soft, non-tender to light palpation. Bowel sounds present. MS: Good muscle strength and tone. No atrophy or abnormal movements. SKIN: Warm and dry. No statis dermatitis or ulcers. NEURO / PSYCH: Oriented to person, place and time. Speech clear and appropriate. Follows all commands. Pleasant affect. DATA:    Telemetry: Normal sinus rhythm with HR in the 90s, artifact. Diagnostic:  All diagnostic testing and lab work thus far this admission reviewed in detail. CXR 07/26/2020: Impression:  Pulmonary hyperinflation in this patient of large habitus without   definite evidence of acute cardiopulmonary pathology.            Intake/Output Summary (Last 24 hours) at 7/27/2020 3430  Last data filed at 7/27/2020 0549  Gross per 24 hour   Intake 240 ml   Output 200 ml   Net 40 ml       Labs:   CBC:   Recent Labs     07/26/20  1401   WBC 5.8   HGB 12.7   HCT 41.6        BMP:   Recent Labs     07/26/20  1401      K 3.7   CO2 26   BUN 11   CREATININE 0.6   LABGLOM >60   CALCIUM 9.5     HgA1c:   Lab Results   Component Value Date    LABA1C 5.7 11/10/2016     CARDIAC ENZYMES:  Recent Labs     07/26/20  1401   TROPONINI <0.01     FASTING LIPID PANEL:  Lab Results   Component Value Date    CHOL 258 11/10/2016    HDL 65 11/10/2016    LDLCALC 177 11/10/2016    TRIG 81 11/10/2016       ASSESSMENT:  1. Dyspnea on exertion: acute on chronic HFpEF component, volume overload on presentation -- improved. Mild bilateral lower extremity edema noted on exam. Symptomatic improvement with diuresis. Echo pending. Possible pulmonary component in setting of COPD and prior tobacco abuse. COVID-19 testing negative. 2. Hyperlipidemia: Continue statin therapy. 3. Chronic obstructive pulmonary disease: Possible acute COPD exacerbation. 4. Obesity. 5. Former tobacco abuse. 6. GERD. 7. History of trigeminal neuralgia. Follows with neurology. 8. History of cerebral venous thrombosis s/p clot retrieval at [facility] in 2016. Follows with neurology. 9. Vitamin D deficiency. RECOMMENDATIONS:  1. Continue IV diuresis at this time. 2. Monitor intake/output, daily weights, kidney function and electrolytes. 3. Echocardiogram will be reviewed when complete. 4. Check lipid panel. 5. Further recommendations pending results of echocardiogram.  6. Consider ischemic evaluation to rule out anginal equivalent if deemed necessary prior to discharge or as an outpatient.   7. Further recommendations as per Dr. Calvert Courser.    The above case and recommendations have been discussed with Dr. Filipe Mejias, [address] [place] [address] Cardiology    Electronically signed by Kushal Bullard PA-C on 7/27/2020 at 7:33 AM _____________________________________________________________________________________  I independently interviewed and examined the patient. I have reviewed the above documentation completed by the WILVER. Please see my additional contributions to the HPI, physical exam, and assessment / medical decision making. HPI, ROS, PMH, PSH, [address], [place], and medications independently reviewed (agree; see above documentation)    History of Present Illness:  Currently with no chest pain, respiratory distress, palpitations. LE edema improved with diuresis.     Review of Systems:   Cardiac: As per HPI  General: No fever, chills  Pulmonary: As per HPI  HEENT: No visual disturbances, difficult swallowing  GI: No nausea, vomiting  Musculoskeletal: AGUDELO x 4, no focal motor deficits  Skin: Intact, no rashes  Neuro/Psych: No headache or seizures    Physical Exam:  /64   Pulse 86   Temp 97.7 °F (36.5 °C) (Oral)   Resp 20   Ht 5' 8\" (1.727 m)   Wt 250 lb 3.2 oz (113.5 kg)   SpO2 96%   BMI 38.04 kg/m²   Wt Readings from Last 3 Encounters:   07/27/20 250 lb 3.2 oz (113.5 kg)   10/02/19 220 lb 11.2 oz (100.1 kg)   03/25/19 231 lb (104.8 kg)     Appearance: Awake, alert, no acute respiratory distress  Skin: Intact, no rash  Head: Normocephalic, atraumatic  Eyes: EOMI, no conjunctival erythema  ENMT: No pharyngeal erythema, MMM, no rhinorrhea  Neck: Supple, no elevated JVP, no carotid bruits  Lungs: Decreased BS B/L, no wheezing  Cardiac: Regular rate and rhythm, +S1S2, no murmurs apparent  Abdomen: Soft, nontender, +bowel sounds  Extremities: Moves all extremities x 4, +lower extremity edema  Neurologic: No focal motor deficits apparent, normal mood and affect    Telemetry: SR, rate 80's-90's    - She take lasix 40 mg BID as an outpatient (\"for many years\") -- \"doesn't work as much for me anymore\"  - Continue IV diuresis for today --> switch to po bumex starting tomorrow  - Echocardiogram today  - Further recommendations pending review of the above  - Pulmonary following    Thank you for allowing me to participate in your patient's care. Please feel free to contact me if you have any questions or concerns.     Tammy Downey MD  [facility]) Cardiology

## 2020-07-27 NOTE — CONSULTS
[address] [address]                                  CONSULTATION    PATIENT NAME: Jd Eugene                        :        1955  MED REC NO:   [id number]                            ROOM:       5387  ACCOUNT NO:   [de-identified]                           ADMIT DATE: 2020  PROVIDER:     Sukhjinder Roberts MD    CONSULT DATE:  2020    REQUESTING PROVIDER:  Dr. Destiney Verdin and Dr. Pavel Martel. IMPRESSION:  1. Shortness of breath. 2.  Hives. 3.  Bilateral lower extremity edema, resolved with diuresis. 4.  I suspect she is having mild exacerbation of her underlying COPD,  which she reports she has normal lung function. She has no other symptoms  besides exertional dyspnea and we will put her on prednisone 30 mg daily  and make her DuoNeb q.i.d. for today. She likely can be discharged  tomorrow. Question is going to be whether or not she needs a  maintenance inhaler and this will likely need to be decided down the  road when she sees Dr. Ayesha Halsted in followup. HISTORY:  A 82-year-old [facility] female reportedly with COPD on  just ProAir has had shortness of breath primarily taking her walks in  the park for the last month. The ProAir does not help. She has no  associated cough, wheezing, or chest pain, just the shortness of breath. She has had no fevers or chills. Her shortness of breath has gotten  progressively more severe to where yesterday she was short of breath  walking up a flight of stairs in her home and then walking  outside to visit her neighbor. Again, yesterday, she was so severe that  she did not even try her ProAir. She just came into the emergency  department. She also notes significant leg swelling, which is a chronic  issue, although better today after diuresis, as well as hives on her  arms, legs, and torso. That has also been an ongoing problem.   She is a  former two-pack-a-day smoker for 15 years, quitting 17 years ago. PAST MEDICAL HISTORY:  Includes what sounds like a trigeminal neuralgia  as well as some type of cerebral vein thrombosis and subsequent subdural  hematoma requiring craniotomy. She has also had hip replacement, tubal  ligation, cholecystectomy, and appendectomy. Her other medical  problems:  She is being worked up for pituitary issues, the details of  which are not clear. FAMILY HISTORY:  Her father  from CHF and had COPD. SOCIAL HISTORY:  Worked as a criminal courtroom . MEDICATIONS:  Currently are Ecotrin 81 mg daily, Lipitor 40 daily, Bumex  1 mg p.o. b.i.d., Benadryl 25 p.o. q. six p.r.n., Sinequan 10 mg  nightly, Lovenox 40 subcu daily, Pepcid 20 b.i.d., DuoNeb one ampule  q.i.d. p.r.n., lorazepam 0.5 q. eight p.r.n., potassium supplements,  Lyrica 200 b.i.d., and sucralfate 1 gm b.i.d. REVIEW OF SYSTEMS:  Reveals no allergies, no fevers, no visual or  hearing complaint, no chest tightness or pain. She is short of breath. No significant heartburn. Currently, no urinary complaints, just the  leg swelling and the hives. Review of systems is otherwise negative. PHYSICAL EXAMINATION:  GENERAL:  She is in no acute distress on room air. VITAL SIGNS:  Temp 36.5, pulse 86, respiratory rate was 20, blood  pressure 123/64. SKIN:  Had no rashes on the arms. HEENT:  Eyes had clear sclerae. Palate and gums were normal.  NECK:  Without masses or adenopathy. CHEST:  Symmetric. There Was no accessory muscle use. HEART:  Regular in rate and rhythm. LUNGS:  Clear, maybe a little diminished. ABDOMEN:  Soft and nontender without masses or organomegaly. EXTREMITIES:  Showed no clubbing, no cyanosis, and no limb edema. NEUROLOGICAL:  She was alert and appropriate. DATA:  Echo shows a normal ejection fraction, normal diastolic function,  EF 65 to 70%. Chest radiograph was reviewed and was clear.   White count  yesterday was 5.8 with a hemoglobin of 12.7 and a platelet count of  736,364. She is hypokalemic today with a K of 3.2. The rest of her  chemistries look normal.  [facility] limited records that were  available were reviewed.         Robert Valentin MD    D: 07/27/2020 14:35:49       T: 07/27/2020 14:40:01     [email or web address]  Job#: [id number]     Doc#: [id number]    CC:

## 2020-07-27 NOTE — PATIENT CARE CONFERENCE
[facility] Quality Flow/Interdisciplinary Rounds Progress Note        Quality Flow Rounds held on July 27, 2020    Disciplines Attending:  Bedside Nurse, ,  and Nursing Unit Leadership    Zohra Hudson was admitted on 7/26/2020 12:44 PM    Anticipated Discharge Date:  Expected Discharge Date: 07/28/20    Disposition:    Serge Score:  Serge Scale Score: 22    Readmission Score:         Discussed patient goal for the day, patient clinical progression, and barriers to discharge. The following Goal(s) of the Day/Commitment(s) have been identified:  Cardio consult and cont to monitor.        Serene Walter  July 27, 2020

## 2020-07-28 VITALS
BODY MASS INDEX: 37.86 KG/M2 | RESPIRATION RATE: 16 BRPM | DIASTOLIC BLOOD PRESSURE: 78 MMHG | HEART RATE: 83 BPM | SYSTOLIC BLOOD PRESSURE: 144 MMHG | OXYGEN SATURATION: 98 % | TEMPERATURE: 97.4 F | HEIGHT: 68 IN | WEIGHT: 249.8 LBS

## 2020-07-28 LAB
ANION GAP SERPL CALCULATED.3IONS-SCNC: 12 MMOL/L (ref 7–16)
BUN BLDV-MCNC: 14 MG/DL (ref 8–23)
CALCIUM SERPL-MCNC: 9.6 MG/DL (ref 8.6–10.2)
CHLORIDE BLD-SCNC: 104 MMOL/L (ref 98–107)
CO2: 24 MMOL/L (ref 22–29)
CREAT SERPL-MCNC: 0.6 MG/DL (ref 0.5–1)
GFR AFRICAN AMERICAN: >60
GFR NON-AFRICAN AMERICAN: >60 ML/MIN/1.73
GLUCOSE BLD-MCNC: 114 MG/DL (ref 74–99)
POTASSIUM REFLEX MAGNESIUM: 4.2 MMOL/L (ref 3.5–5)
SODIUM BLD-SCNC: 140 MMOL/L (ref 132–146)

## 2020-07-28 PROCEDURE — G0378 HOSPITAL OBSERVATION PER HR: HCPCS

## 2020-07-28 PROCEDURE — 6360000002 HC RX W HCPCS: Performed by: FAMILY MEDICINE

## 2020-07-28 PROCEDURE — 99233 SBSQ HOSP IP/OBS HIGH 50: CPT | Performed by: INTERNAL MEDICINE

## 2020-07-28 PROCEDURE — 80048 BASIC METABOLIC PNL TOTAL CA: CPT

## 2020-07-28 PROCEDURE — 6370000000 HC RX 637 (ALT 250 FOR IP): Performed by: INTERNAL MEDICINE

## 2020-07-28 PROCEDURE — 6370000000 HC RX 637 (ALT 250 FOR IP): Performed by: FAMILY MEDICINE

## 2020-07-28 PROCEDURE — 2580000003 HC RX 258: Performed by: FAMILY MEDICINE

## 2020-07-28 PROCEDURE — 96372 THER/PROPH/DIAG INJ SC/IM: CPT

## 2020-07-28 PROCEDURE — 36415 COLL VENOUS BLD VENIPUNCTURE: CPT

## 2020-07-28 RX ORDER — POTASSIUM CHLORIDE 20 MEQ/1
20 TABLET, EXTENDED RELEASE ORAL
Qty: 60 TABLET | Refills: 3 | Status: SHIPPED | OUTPATIENT
Start: 2020-07-28

## 2020-07-28 RX ORDER — PREDNISONE 10 MG/1
30 TABLET ORAL DAILY
Qty: 30 TABLET | Refills: 0 | Status: SHIPPED | OUTPATIENT
Start: 2020-07-29 | End: 2020-08-08

## 2020-07-28 RX ORDER — BUMETANIDE 1 MG/1
1 TABLET ORAL 2 TIMES DAILY
Qty: 30 TABLET | Refills: 3 | Status: SHIPPED | OUTPATIENT
Start: 2020-07-28

## 2020-07-28 RX ADMIN — ATORVASTATIN CALCIUM 40 MG: 40 TABLET, FILM COATED ORAL at 08:15

## 2020-07-28 RX ADMIN — SODIUM CHLORIDE, PRESERVATIVE FREE 10 ML: 5 INJECTION INTRAVENOUS at 08:19

## 2020-07-28 RX ADMIN — POTASSIUM CHLORIDE 20 MEQ: 20 TABLET, EXTENDED RELEASE ORAL at 08:16

## 2020-07-28 RX ADMIN — PREDNISONE 30 MG: 5 TABLET ORAL at 08:15

## 2020-07-28 RX ADMIN — FAMOTIDINE 20 MG: 20 TABLET, FILM COATED ORAL at 08:16

## 2020-07-28 RX ADMIN — BUMETANIDE 1 MG: 1 TABLET ORAL at 06:51

## 2020-07-28 RX ADMIN — ASPIRIN 81 MG: 81 TABLET, COATED ORAL at 08:15

## 2020-07-28 RX ADMIN — ENOXAPARIN SODIUM 40 MG: 40 INJECTION SUBCUTANEOUS at 08:16

## 2020-07-28 RX ADMIN — SUCRALFATE 1 G: 1 TABLET ORAL at 08:15

## 2020-07-28 RX ADMIN — PREGABALIN 200 MG: 75 CAPSULE ORAL at 08:15

## 2020-07-28 ASSESSMENT — PAIN SCALES - GENERAL: PAINLEVEL_OUTOF10: 0

## 2020-07-28 NOTE — DISCHARGE SUMMARY
Discharge Summary     Patient ID:  Linda Tapia  [id number]  7 y.o. 1955 female  Joey Davis MD        Admit date: 7/26/2020    Discharge date and time:  7/28/2020  8:28 AM      Activity level: Up ad abdi. Disposition: Home  Condition on Discharge: Good    Admit Diagnoses: Dyspnea    Discharge Diagnoses: Acute asthma  With immunological inflammation from bee sting chronic obstructive pulmonary disease  Consults:  IP CONSULT TO INTERNAL MEDICINE  IP CONSULT TO CARDIOLOGY  IP CONSULT TO PULMONOLOGY    Procedures:     Hospital Course: 74-year-old woman noticed over several weeks with hot humid weather she was getting extremely short of breath on exertion during her daily walks. She also was swelling in her legs as she has chronic venous insufficiency. She was seen by pulmonary and I would support placing her on a maintenance inhaler during the summer months. She will go home on prednisone but this probably should be decreased as she returns to normal life      LABS:  Recent Labs     07/26/20  1401 07/27/20  0645 07/28/20  0424    144 140   K 3.7 3.2* 4.2    108* 104   CO2 26 25 24   BUN 11 10 14   CREATININE 0.6 0.7 0.6   GLUCOSE 119* 96 114*   CALCIUM 9.5 9.2 9.6       Recent Labs     07/26/20  1401   WBC 5.8   RBC 4.95   HGB 12.7   HCT 41.6   MCV 84.0   MCH 25.7*   MCHC 30.5*   RDW 14.5      MPV 11.5       No results for input(s): GLUMET in the last 72 hours.       Patient Instructions:   Current Discharge Medication List      START taking these medications    Details   predniSONE (DELTASONE) 10 MG tablet Take 3 tablets by mouth daily for 10 days  Qty: 30 tablet, Refills: 0      bumetanide (BUMEX) 1 MG tablet Take 1 tablet by mouth 2 times daily  Qty: 30 tablet, Refills: 3      potassium chloride (KLOR-CON M) 20 MEQ extended release tablet Take 1 tablet by mouth 3 times daily (with meals)  Qty: 60 tablet, Refills: 3         CONTINUE these medications which have NOT CHANGED Details   pregabalin (LYRICA) 200 MG capsule TAKE 1 CAPSULE BY MOUTH TWICE DAILY  Qty: 60 capsule, Refills: 5    Associated Diagnoses: Atypical facial pain      doxepin (SINEQUAN) 10 MG capsule TAKE 1 CAPSULE BY MOUTH EVERY NIGHT  Qty: 30 capsule, Refills: 5      atorvastatin (LIPITOR) 40 MG tablet Take 1 tablet by mouth daily      LORazepam (ATIVAN) 0.5 MG tablet as needed.    Refills: 1      pseudoephedrine (SUDAFED) 30 MG tablet Take 30 mg by mouth every 4 hours as needed for Congestion      aspirin 81 MG tablet Take 81 mg by mouth daily      RA VITAMIN D-3 2000 UNITS CAPS 1 capsule daily       DYMISTA 137-50 MCG/ACT SUSP as needed       SUMAtriptan (IMITREX) 100 MG tablet Take 1 tablet by mouth once as needed for Migraine  Qty: 6 tablet, Refills: 2    Associated Diagnoses: Migraine without aura and without status migrainosus, not intractable         STOP taking these medications       sucralfate (CARAFATE) 1 GM tablet Comments:   Reason for Stopping:         furosemide (LASIX) 40 MG tablet Comments:   Reason for Stopping:                   Signed:  Electronically signed by Bianka Simpson MD on 7/28/2020 at 8:28 AM

## 2020-07-28 NOTE — PROGRESS NOTES
INPATIENT CARDIOLOGY FOLLOW-UP    Name: Isidro Mendes    Age: 60 y.o. Date of Admission: 7/26/2020 12:44 PM    Date of Service: 7/28/2020    Chief Complaint: Follow-up for SOB, acute on chronic HFpEF    Interim History:  Currently with no chest pain, respiratory distress, palpitations. LE edema improved with diuresis. No new overnight cardiac complaints. Currently with no complaints of CP, SOB, palpitations, dizziness, or lightheadedness. SR on telemetry.     Review of Systems:   Cardiac: As per HPI  General: No fever, chills  Pulmonary: As per HPI  HEENT: No visual disturbances, difficult swallowing  GI: No nausea, vomiting  Musculoskeletal: AGUDELO x 4, no focal motor deficits  Skin: Intact, no rashes  Neuro/Psych: No headache or seizures    Problem List:  Patient Active Problem List   Diagnosis    Cavernous sinus thrombosis    Acute congestive heart failure (HCC)    COPD (chronic obstructive pulmonary disease) (HCC)    Dyspnea    Hyperlipemia    Edema       Allergies:  No Known Allergies    Current Medications:  Current Facility-Administered Medications   Medication Dose Route Frequency Provider Last Rate Last Dose    aspirin EC tablet 81 mg  81 mg Oral Daily Wayne Bhat MD   81 mg at 07/28/20 0815    atorvastatin (LIPITOR) tablet 40 mg  40 mg Oral Daily Wayne Bhat MD   40 mg at 07/28/20 0815    doxepin (SINEQUAN) capsule 10 mg  10 mg Oral Nightly Wayne Bhat MD   10 mg at 07/27/20 2028    LORazepam (ATIVAN) tablet 0.5 mg  0.5 mg Oral Q8H PRN Wayne Bhat MD        pregabalin (LYRICA) capsule 200 mg  200 mg Oral BID Wayne Bhat MD   200 mg at 07/28/20 0815    sucralfate (CARAFATE) tablet 1 g  1 g Oral 2 times per day Wayne Bhat MD   1 g at 07/28/20 0815    sodium chloride flush 0.9 % injection 10 mL  10 mL Intravenous 2 times per day Wayne Bhat MD   10 mL at 07/28/20 0819    sodium chloride flush 0.9 % injection 10 mL  10 mL Intravenous PRN Wayne Bhat MD       [facility] acetaminophen (TYLENOL) tablet 650 mg  650 mg Oral Q6H PRN Pastor Nessa MD        Or    acetaminophen (TYLENOL) suppository 650 mg  650 mg Rectal Q6H PRN Pastor Nessa MD        polyethylene glycol [facility]) packet 17 g  17 g Oral Daily PRN Pastor Nessa MD        promethazine (PHENERGAN) tablet 12.5 mg  12.5 mg Oral Q6H PRN Pastor Nessa MD        Or    [facility]) injection 4 mg  4 mg Intravenous Q6H PRN Pastor Nessa MD        enoxaparin (LOVENOX) injection 40 mg  40 mg Subcutaneous Daily Pastor Nessa MD   40 mg at 07/28/20 0816    famotidine (PEPCID) tablet 20 mg  20 mg Oral BID Pastor Nessa MD   20 mg at 07/28/20 0816    potassium chloride (KLOR-CON M) extended release tablet 20 mEq  20 mEq Oral TID WC Pastor Nessa MD   20 mEq at 07/28/20 0816    diphenhydrAMINE (BENADRYL) tablet 25 mg  25 mg Oral Q6H PRN Pastor Nessa MD        bumetanide [facility]) tablet 1 mg  1 mg Oral BID Tess Mock MD   1 mg at 07/28/20 8228    predniSONE (DELTASONE) tablet 30 mg  30 mg Oral Daily Sol Vasquez MD   30 mg at 07/28/20 0815    ipratropium-albuterol (DUONEB) nebulizer solution 1 ampule  1 ampule Inhalation 4x daily Sol Vasquez MD   1 ampule at 07/27/20 2111         Physical Exam:  BP (!) 144/78   Pulse 83   Temp 97.4 °F (36.3 °C) (Oral)   Resp 16   Ht 5' 8\" (1.727 m)   Wt 249 lb 12.8 oz (113.3 kg)   SpO2 98%   BMI 37.98 kg/m²   Wt Readings from Last 3 Encounters:   07/28/20 249 lb 12.8 oz (113.3 kg)   10/02/19 220 lb 11.2 oz (100.1 kg)   03/25/19 231 lb (104.8 kg)     Appearance: Awake, alert, no acute respiratory distress  Skin: Intact, no rash  Head: Normocephalic, atraumatic  Eyes: EOMI, no conjunctival erythema  ENMT: No pharyngeal erythema, MMM, no rhinorrhea  Neck: Supple, no elevated JVP, no carotid bruits  Lungs: Decreased BS B/L, no wheezing  Cardiac: Regular rate and rhythm, +S1S2, no murmurs apparent  Abdomen: Soft, nontender, +bowel sounds  Extremities: Moves all extremities x 4, +lower extremity edema (improved)  Neurologic: No focal motor deficits apparent, normal mood and affect    Intake/Output:    Intake/Output Summary (Last 24 hours) at 7/28/2020 1048  Last data filed at 7/27/2020 1628  Gross per 24 hour   Intake 480 ml   Output 300 ml   Net 180 ml     No intake/output data recorded. Laboratory Tests:  Recent Labs     07/26/20  1401 07/27/20  0645 07/28/20  0424    144 140   K 3.7 3.2* 4.2    108* 104   CO2 26 25 24   BUN 11 10 14   CREATININE 0.6 0.7 0.6   GLUCOSE 119* 96 114*   CALCIUM 9.5 9.2 9.6     Lab Results   Component Value Date    MG 1.9 07/27/2020     No results for input(s): ALKPHOS, ALT, AST, PROT, BILITOT, BILIDIR, LABALBU in the last 72 hours.   Recent Labs     07/26/20  1401   WBC 5.8   RBC 4.95   HGB 12.7   HCT 41.6   MCV 84.0   MCH 25.7*   MCHC 30.5*   RDW 14.5      MPV 11.5     Lab Results   Component Value Date    CKTOTAL 128 05/22/2015    CKMB 1.1 05/22/2015    TROPONINI <0.01 07/26/2020    TROPONINI <0.01 05/22/2015     Lab Results   Component Value Date    INR 3.8 06/08/2015    INR 1.5 06/04/2015    INR 1.1 05/22/2015    PROTIME 41.3 (H) 06/08/2015    PROTIME 16.5 (H) 06/04/2015    PROTIME 11.6 05/22/2015     Lab Results   Component Value Date    TSH 1.020 07/18/2016     Lab Results   Component Value Date    LABA1C 5.7 11/10/2016     No results found for: EAG  Lab Results   Component Value Date    CHOL 258 (H) 11/10/2016    CHOL 154 05/23/2015     Lab Results   Component Value Date    TRIG 81 11/10/2016    TRIG 76 05/23/2015     Lab Results   Component Value Date    HDL 65 11/10/2016    HDL 29 05/23/2015     Lab Results   Component Value Date    LDLCALC 177 (H) 11/10/2016    LDLCALC 110 (H) 05/23/2015     Lab Results   Component Value Date    LABVLDL 16 11/10/2016    LABVLDL 15 05/23/2015     No results found for: CHOLHDLRATIO  Recent Labs     07/26/20  1401   PROBNP 336*       Cardiac Tests:  Telemetry findings reviewed: SR, rate 80's    Echocardiogram: 7/27/2020 (Dr. Steph Novak)   Normal left ventricular chamber size. Hyperdynamic left ventricular   systolic function. Visually estimated LVEF is 65-70 %. Normal diastolic function. Normal right ventricle structure and function. No significant valvular abnormalities. The inferior vena cava diameter is normal with normal respiratory   variation. Estimated right atrial pressure is 3 mmHg. No comparison study available. ASSESSMENT / PLAN:  1. Dyspnea on exertion: acute on chronic HFpEF component, volume overload on presentation -- improved. Symptomatic improvement with diuresis. Mild COPDE per pulmonary. COVID-19 testing negative. 2. Hyperlipidemia: Continue statin therapy. 3. COPDE  4. Obesity. 5. Former tobacco abuse. 6. GERD. 7. History of trigeminal neuralgia. Follows with neurology. 8. History of cerebral venous thrombosis s/p clot retrieval at [facility] in 2016. Follows with neurology. 9. Vitamin D deficiency.      - She take lasix 40 mg BID as an outpatient (\"for many years\") -- \"doesn't work as much for me anymore\"  - Stop IV diuresis --> add po bumex  - Echocardiogram results reviewed with the patient  - Discussed option of outpatient stress test once clinically improved  - Pulmonary following  - Discharge planning    Rubi Bourgeois MD  [facility]) Cardiology

## 2020-07-28 NOTE — PATIENT CARE CONFERENCE
[facility] Quality Flow/Interdisciplinary Rounds Progress Note        Quality Flow Rounds held on July 28, 2020    Disciplines Attending:  Bedside Nurse, ,  and Nursing Unit Leadership    Echo Hernández was admitted on 7/26/2020 12:44 PM    Anticipated Discharge Date:  Expected Discharge Date: 07/28/20    Disposition:    Serge Score:  Serge Scale Score: 22    Readmission Score:         Discussed patient goal for the day, patient clinical progression, and barriers to discharge. The following Goal(s) of the Day/Commitment(s) have been identified:  Input from consults and discharge planning.        Jaron Randolph  July 28, 2020

## 2020-07-29 ENCOUNTER — CARE COORDINATION (OUTPATIENT)
Dept: CASE MANAGEMENT | Age: 65
End: 2020-07-29

## 2020-07-30 ENCOUNTER — CARE COORDINATION (OUTPATIENT)
Dept: CASE MANAGEMENT | Age: 65
End: 2020-07-30

## 2020-07-30 NOTE — CARE COORDINATION
COVID-19 Monitoring Initial Follow-up Note    Second attempt to reach the patient for COVID Monitoring (negative) Care Transition call post hospital discharge. Message left with CTN's contact information requesting return phone call. Will sign off.

## 2020-08-03 ENCOUNTER — TELEPHONE (OUTPATIENT)
Dept: CARDIOLOGY CLINIC | Age: 65
End: 2020-08-03

## 2020-08-04 ENCOUNTER — TELEPHONE (OUTPATIENT)
Dept: CARDIOLOGY CLINIC | Age: 65
End: 2020-08-04

## 2020-08-04 ENCOUNTER — TELEPHONE (OUTPATIENT)
Dept: ADMINISTRATIVE | Age: 65
End: 2020-08-04

## 2020-08-04 NOTE — TELEPHONE ENCOUNTER
Pt returned call to office to schedule HFU, I called and reached Torie who transferred me to [facility], unable to reach anyone, please call pt on cell [phone] to schedule

## 2020-08-04 NOTE — TELEPHONE ENCOUNTER
Called pt and she said she was told by Dr. Sukhjinder Archibald that there was nothing wrong with her heart. She said she was told to call if she has problems and he'd schedule a stress test. I asked if she scheduled an appointment to see him and she said there was no need.

## 2020-08-18 ENCOUNTER — VIRTUAL VISIT (OUTPATIENT)
Dept: NEUROLOGY | Age: 65
End: 2020-08-18
Payer: MEDICARE

## 2020-08-18 PROCEDURE — 99443 PR PHYS/QHP TELEPHONE EVALUATION 21-30 MIN: CPT | Performed by: CLINICAL NURSE SPECIALIST

## 2020-08-18 RX ORDER — PREGABALIN 200 MG/1
CAPSULE ORAL
Qty: 60 CAPSULE | Refills: 5 | Status: SHIPPED
Start: 2020-08-18 | End: 2021-02-11 | Stop reason: SDUPTHER

## 2020-08-18 NOTE — TELEPHONE ENCOUNTER
Called pt and she said she was told by Dr. Jose Antony that there was nothing wrong with her heart. She said she was told to call if she has problems and he'd schedule a stress test. I asked if she scheduled an appointment to see him and she said there was no need.     See encounter on 8/4 per Kenji Zhang

## 2020-08-18 NOTE — PROGRESS NOTES
Echo Hernández is a 60 y.o. female evaluated via telephone on 8/18/2020. Consent:  She and/or health care decision maker is aware that that she may receive a bill for this telephone service, depending on her insurance coverage, and has provided verbal consent to proceed: Yes    I affirm this is a Patient Initiated Episode with an Established Patient who has not had a related appointment within my department in the past 7 days or scheduled within the next 24 hours. Total Time: minutes: 21-30 minutes    Patient advised regarding steps to help prevent the spread of COVID-19   SOURCE - https://heriberto-holloway.info/. html     1-Stay home except to get medical care  2-Clean your hands often for atleast 20 secnds, avoid touching: Avoid touching your eyes, nose, and mouth with unwashed hands. 3-Seek medical attention: Seek prompt medical attention if your illness is worsening (e.g., difficulty breathing).   Call you doctor first.  3-Wear a facemask if you are sick   4-Cover your coughs and sneezes        Note: not billable if this call serves to triage the patient into an appointment for the relevant concern  Date of Telephone Visit:   4/8/20     Consent:  The patient and/or health care decision maker is aware that that he may receive a bill for this telephone service, depending on his insurance coverage, and has provided verbal consent to proceed: Yes    Long history of atypical facial pain following a bout with trigeminal neuralgia   A presumed infection triggering her issues   Tried gabapentin and baclofen without improvement   Responded to Lyrica at high doses quite well and recently, Doxepin (per Dr Lowe Spray)      In June 2016 she had the flu for about 3 days   Shortly afterwards, developed severe headache -- seen in ER, diagnosed with CVT   Transferred to [facility] -- underwent clot retrieval and recovered well    Felt she was severely dehydrated from influenza    Left with short-term memory deficits and now no longer working at the [employer]     denies visual issues - no double vision or loss of vision   No nausea or vomiting    Remains off antiplatelets or AC   Was previously started on warfarin -- developed bleeding and it was discontinued by [facility]    Now on daily baby Aspirin     Recent visit to ED for SOB r/t asthma     No chest pain or palpitations  No SOB  No vertigo, lightheadedness or loss of consciousness  No falls, tripping or stumbling  No incontinence of bowels or bladder  No itching or bruising appreciated  No numbness, tingling or focal arm/leg weakness    ROS otherwise negative     Prior to Visit Medications    Medication Sig Taking? Authorizing Provider   bumetanide (BUMEX) 1 MG tablet Take 1 tablet by mouth 2 times daily Yes Prasanth Urrutia MD   potassium chloride (KLOR-CON M) 20 MEQ extended release tablet Take 1 tablet by mouth 3 times daily (with meals) Yes Prasanth Urrutia MD   SUMAtriptan (IMITREX) 100 MG tablet Take 1 tablet by mouth once as needed for Migraine Yes Isabela [place], APRN - CNS   pregabalin (LYRICA) 200 MG capsule TAKE 1 CAPSULE BY MOUTH TWICE DAILY Yes Isabela [place], APRN - CNS   doxepin (SINEQUAN) 10 MG capsule TAKE 1 CAPSULE BY MOUTH EVERY NIGHT Yes Isabela [place], APRN - CNS   atorvastatin (LIPITOR) 40 MG tablet Take 1 tablet by mouth daily Yes Historical Provider, MD   LORazepam (ATIVAN) 0.5 MG tablet as needed.   Yes Historical Provider, MD   pseudoephedrine (SUDAFED) 30 MG tablet Take 30 mg by mouth every 4 hours as needed for Congestion Yes Historical Provider, MD   aspirin 81 MG tablet Take 81 mg by mouth daily Yes Historical Provider, MD JACOBO VITAMIN D-3 2000 UNITS CAPS 1 capsule daily  Yes Historical Provider, MD   DYMISTA 137-50 MCG/ACT SUSP as needed  Yes Historical Provider, MD     Allergies as of 08/18/2020    (No Known Allergies)     Objective:     Mental Status: alert and oriented to person place and time    Speech: clear  Language: normal with no anomia today       Laboratory/Radiology:     CBC:   Lab Results   Component Value Date    WBC 5.8 07/26/2020    RBC 4.95 07/26/2020    HGB 12.7 07/26/2020    HCT 41.6 07/26/2020    MCV 84.0 07/26/2020    MCH 25.7 07/26/2020    MCHC 30.5 07/26/2020    RDW 14.5 07/26/2020     07/26/2020    MPV 11.5 07/26/2020     BMP:    Lab Results   Component Value Date     07/28/2020    K 4.2 07/28/2020     07/28/2020    CO2 24 07/28/2020    BUN 14 07/28/2020    LABALBU 4.2 11/10/2016    LABALBU 4.0 04/27/2011    CREATININE 0.6 07/28/2020    CALCIUM 9.6 07/28/2020    GFRAA >60 07/28/2020    LABGLOM >60 07/28/2020    GLUCOSE 114 07/28/2020    GLUCOSE 80 04/27/2011     CT Head June 2018    Unremarkable     Carotid US March 2019   No significant stenosis     Labs and dx test personally reviewed at this appt     Assessment:     Atypical facial pain   Improved with Lyrica at current dosage and Doxepin     Gabapentin and baclofen was not helpful in the past    Migraine headache -- rare    Sleep deprivation   May need to consider r/o CYNDEE in future - states previous sleep studies unremarkable     clot retrieval for CVT   Doing well neurologically with minimal cognitive, word-finding issues     Plan:     Continue Lyrica 200 BID and Doxepin at 10mg daily   New script provided     Sabas Osorio  1:25 PM  8/18/2020

## 2020-08-21 ENCOUNTER — HOSPITAL ENCOUNTER (OUTPATIENT)
Age: 65
Discharge: HOME | End: 2020-08-21
Payer: MEDICARE

## 2020-08-21 LAB
BASOPHILS ABSOLUTE: 0 E9/L (ref 0–0.2)
BASOPHILS RELATIVE PERCENT: 0 % (ref 0–2)
EOSINOPHILS ABSOLUTE: 0 E9/L (ref 0.05–0.5)
EOSINOPHILS RELATIVE PERCENT: 0 % (ref 0–6)
HCT VFR BLD CALC: 42.9 % (ref 34–48)
HEMOGLOBIN: 13.6 G/DL (ref 11.5–15.5)
IMMATURE GRANULOCYTES #: 0.01 E9/L
IMMATURE GRANULOCYTES %: 0.2 % (ref 0–5)
LYMPHOCYTES ABSOLUTE: 1.66 E9/L (ref 1.5–4)
LYMPHOCYTES RELATIVE PERCENT: 39.2 % (ref 20–42)
MCH RBC QN AUTO: 26 PG (ref 26–35)
MCHC RBC AUTO-ENTMCNC: 31.7 % (ref 32–34.5)
MCV RBC AUTO: 81.9 FL (ref 80–99.9)
MONOCYTES ABSOLUTE: 0.57 E9/L (ref 0.1–0.95)
MONOCYTES RELATIVE PERCENT: 13.4 % (ref 2–12)
NEUTROPHILS ABSOLUTE: 2 E9/L (ref 1.8–7.3)
NEUTROPHILS RELATIVE PERCENT: 47.2 % (ref 43–80)
PDW BLD-RTO: 14.6 FL (ref 11.5–15)
PLATELET # BLD: 246 E9/L (ref 130–450)
PMV BLD AUTO: 11.2 FL (ref 7–12)
RBC # BLD: 5.24 E12/L (ref 3.5–5.5)
WBC # BLD: 4.2 E9/L (ref 4.5–11.5)

## 2020-08-21 PROCEDURE — 85025 COMPLETE CBC W/AUTO DIFF WBC: CPT

## 2020-08-21 PROCEDURE — 82785 ASSAY OF IGE: CPT

## 2020-08-21 PROCEDURE — 86003 ALLG SPEC IGE CRUDE XTRC EA: CPT

## 2020-08-21 PROCEDURE — 36415 COLL VENOUS BLD VENIPUNCTURE: CPT

## 2020-08-31 LAB
Lab: NORMAL
REPORT: NORMAL
THIS TEST SENT TO: NORMAL

## 2020-10-13 ENCOUNTER — OFFICE VISIT (OUTPATIENT)
Dept: ENDOCRINOLOGY | Age: 65
End: 2020-10-13
Payer: MEDICARE

## 2020-10-13 VITALS
OXYGEN SATURATION: 95 % | SYSTOLIC BLOOD PRESSURE: 136 MMHG | RESPIRATION RATE: 16 BRPM | TEMPERATURE: 98.7 F | HEART RATE: 80 BPM | BODY MASS INDEX: 34.89 KG/M2 | WEIGHT: 230.2 LBS | HEIGHT: 68 IN | DIASTOLIC BLOOD PRESSURE: 82 MMHG

## 2020-10-13 PROCEDURE — 99205 OFFICE O/P NEW HI 60 MIN: CPT | Performed by: INTERNAL MEDICINE

## 2020-10-13 NOTE — LETTER
[address] [place] [address] Department of Endocrinology Diabetes and Metabolism   [address] [place] [address]   Phone: [phone]  Fax: [phone]      Provider: Floyd Granados MD  Primary Care Physician: Rocky Rose MD   Referring Provider: Artemio Lucas MD    Patient: Venu Hawthorne  YOB: 1955  Date of Visit: 10/13/2020      Dear Dr. Rocky Rose MD   I had the pleasure of seeing your patient Venu Hawthorne today at endocrine clinic for consultation visit and I enclosed a copy of the office visit completed today. Thank you very much for asking us to participate in the care of this very pleasant patient. Please don't hesitate to call if there are any further questions or concerns. Sincerely   Floyd Granados MD  Endocrinologist, [address] [address]   Phone: [phone]  Fax: [phone] [facility] Department of Endocrinology Diabetes and Metabolism   [address] [place] [address], [address] [facility],[address] [address]   Phone: [phone]  Fax: [phone]    Date of Service: 10/13/2020    Primary Care Physician: Rocky Rose MD  Referring physician: Artemio Lucas MD  Provider: Floyd Granados MD    Reason for the visit:  Hyperthyroidism    History of Present Illness: The history is provided by the patient. No  was used. Accuracy of the patient data is excellent. Judy Dixon is a very pleasant 60 y.o. female seen today for evaluation and management of hyperthyroidism     The patient was diagnosed with hyperthyroidism in 2015.  At that time pt was started on medication but she didn't feel good on it and self discontinued it     She is currently not on any thyroid medications     Pt was told that labs in 7/2020 showed hyperthyroidism    Few months ago she was told that her thyroid hormones are high and referred to us for further management We didn't receive the recent lab result     Patient reported few episodes of palpitations, but denied swelling in the area of the thyroid gland, weight loss, change in appetite, nervousness, anxiety, irritability, tremor, sweating, heat intolerance, changes in bowel habits, muscle weakness or difficulty sleeping. PAST MEDICAL HISTORY   Past Medical History:   Diagnosis Date    GERD (gastroesophageal reflux disease)     History of colonoscopy 2006    neg    Trigeminal neuralgia        PAST SURGICAL HISTORY   Past Surgical History:   Procedure Laterality Date    APPENDECTOMY      1972    BRAIN SURGERY      CHOLECYSTECTOMY      1996    DILATION AND CURETTAGE      9/07    FINGER TRIGGER RELEASE      JOINT REPLACEMENT Left     Left hip    TUBAL LIGATION      1986       SOCIAL HISTORY   Tobacco:   reports that she quit smoking about 17 years ago. She has never used smokeless tobacco.  Alcohol:   reports no history of alcohol use. Drugs:   reports no history of drug use.     FAMILY HISTORY   Family History   Problem Relation Age of Onset    Diabetes Maternal Grandmother     Stroke Maternal Grandmother     Hypertension Maternal Grandmother     High Cholesterol Maternal Grandmother     Heart Disease Maternal Grandmother     Osteoporosis Maternal Grandmother     Heart Disease Father     Hypertension Mother     High Cholesterol Mother     Osteoporosis Mother     Sudden Death Mother        ALLERGIES AND DRUG REACTIONS   No Known Allergies    CURRENT MEDICATIONS   Current Outpatient Medications   Medication Sig Dispense Refill    pregabalin (LYRICA) 200 MG capsule TAKE 1 CAPSULE BY MOUTH TWICE DAILY 60 capsule 5    bumetanide (BUMEX) 1 MG tablet Take 1 tablet by mouth 2 times daily 30 tablet 3    potassium chloride (KLOR-CON M) 20 MEQ extended release tablet Take 1 tablet by mouth 3 times daily (with meals) (Patient taking differently: Take 20 mEq by mouth daily ) 60 tablet 3 HEENT: normocephalic non traumatic, no exophthalmos, no lid lag, no lid retraction   Neck: supple, no LN enlargement, no thyromegaly, no thyroid tenderness, no thyroid bruit, no JVD. Pulm: Clear equal air entry no added sounds, no wheezing or rhonchi    CVS: S1 + S2, no murmur, no heave. Dorsalis pedis pulse palpable   Abd: soft lax, no tenderness, no organomegaly, audible bowel sounds. Skin: warm, no lesions, no rash.  No palmar erythema, no onycholysis, no pretibial Myxoedema, no acropachy   Musculoskeletal: No back tenderness, no kyphosis/scoliosis    Neuro: CN intact, sensation notmal , muscle power normal. Fine tremors   Psych: normal mood, and affect    Review of Laboratory Data:  I have reviewed the following:  Lab Results   Component Value Date/Time    WBC 4.2 (L) 08/21/2020 11:02 AM    RBC 5.24 08/21/2020 11:02 AM    HGB 13.6 08/21/2020 11:02 AM    HCT 42.9 08/21/2020 11:02 AM    MCV 81.9 08/21/2020 11:02 AM    MCH 26.0 08/21/2020 11:02 AM    MCHC 31.7 (L) 08/21/2020 11:02 AM    RDW 14.6 08/21/2020 11:02 AM     08/21/2020 11:02 AM    MPV 11.2 08/21/2020 11:02 AM      Lab Results   Component Value Date/Time     07/28/2020 04:24 AM    K 4.2 07/28/2020 04:24 AM    CO2 24 07/28/2020 04:24 AM    BUN 14 07/28/2020 04:24 AM    CREATININE 0.6 07/28/2020 04:24 AM    CALCIUM 9.6 07/28/2020 04:24 AM    LABGLOM >60 07/28/2020 04:24 AM    GFRAA >60 07/28/2020 04:24 AM      Lab Results   Component Value Date/Time    TSH 1.020 07/18/2016 12:20 PM    T4FREE 1.36 07/18/2016 12:20 PM    O0BYGYN 16.6 (H) 05/22/2015 01:05 PM    FT3 3.5 07/18/2016 12:20 PM     Lab Results   Component Value Date    LABA1C 5.7 11/10/2016    GLUCOSE 114 07/28/2020    GLUCOSE 80 04/27/2011     Lab Results   Component Value Date    TRIG 81 11/10/2016    HDL 65 11/10/2016    LDLCALC 177 11/10/2016    CHOL 258 11/10/2016     No results found for: VITD25    Medical Records/Labs/Images Review: I personally reviewed and summarized previous records   All labs and imaging were reviewed independently    Sean Webb, a 60 y.o.-old female seen in for management of following issues      Hyperthyroidism  · Currently not on treatment   · Check TSH, free T4, total T3, TSI, TPO-Ab, Tg-Ab   · Will also order thyroid ultrasound   · Will follow results and proceed accordingly   · If level come back high, will likely start methimazole. Reviewed potential rare but serious side effects of Methimazole, including agranulocytosis and liver dysfunction (cholestasis). Bone health/vitD deficiency    · Discussed the effect of hyperthyroidism on bone health  · Encourage taking vitD 2000 iu/day over the counter    Return in about 3 months (around 1/13/2021) for Hyperthytoidism . The above issues were reviewed with the patient who understood and agreed with the plan. Thank you for allowing us to participate in the care of this patient. Please do not hesitate to contact us with any additional questions. Diagnosis Orders   1. Hyperthyroidism  TSH without Reflex    T4, Free    T4    T3    US THYROID    Thyroid AB    Thyroid Stimulating Immunoglobulin     Reyes Toribio MD  Endocrinologist, [facility] Diabetes Care and Endocrinology   [address] [facility], [address] [place] [address],[address] [address]   Phone: [phone]  Fax: [phone]  ---------------------------------  An electronic signature was used to authenticate this note.  Valarie Griffiths MD on 10/13/2020 at 7:58 PM

## 2020-10-13 NOTE — PROGRESS NOTES
[address] [place] [address] Department of Endocrinology Diabetes and Metabolism   [address] [facility] [address]   Phone: [phone]  Fax: [phone]    Date of Service: 10/13/2020    Primary Care Physician: Patricia Brito MD  Referring physician: Hermes Dickson MD  Provider: David Tom MD    Reason for the visit:  Hyperthyroidism    History of Present Illness: The history is provided by the patient. No  was used. Accuracy of the patient data is excellent. Nadya Almanzar is a very pleasant 60 y.o. female seen today for evaluation and management of hyperthyroidism     The patient was diagnosed with hyperthyroidism in 2015. At that time pt was started on medication but she didn't feel good on it and self discontinued it     She is currently not on any thyroid medications     Pt was told that labs in 7/2020 showed hyperthyroidism    Few months ago she was told that her thyroid hormones are high and referred to us for further management     We didn't receive the recent lab result     Patient reported few episodes of palpitations, but denied swelling in the area of the thyroid gland, weight loss, change in appetite, nervousness, anxiety, irritability, tremor, sweating, heat intolerance, changes in bowel habits, muscle weakness or difficulty sleeping. PAST MEDICAL HISTORY   Past Medical History:   Diagnosis Date    GERD (gastroesophageal reflux disease)     History of colonoscopy 2006    neg    Trigeminal neuralgia        PAST SURGICAL HISTORY   Past Surgical History:   Procedure Laterality Date    APPENDECTOMY      1972    BRAIN SURGERY      CHOLECYSTECTOMY      1996    DILATION AND CURETTAGE      9/07    FINGER TRIGGER RELEASE      JOINT REPLACEMENT Left     Left hip    TUBAL LIGATION      1986       SOCIAL HISTORY   Tobacco:   reports that she quit smoking about 17 years ago.  She has never used smokeless tobacco.  Alcohol:   reports no history of alcohol use. Drugs:   reports no history of drug use. FAMILY HISTORY   Family History   Problem Relation Age of Onset    Diabetes Maternal Grandmother     Stroke Maternal Grandmother     Hypertension Maternal Grandmother     High Cholesterol Maternal Grandmother     Heart Disease Maternal Grandmother     Osteoporosis Maternal Grandmother     Heart Disease Father     Hypertension Mother     High Cholesterol Mother     Osteoporosis Mother     Sudden Death Mother        ALLERGIES AND DRUG REACTIONS   No Known Allergies    CURRENT MEDICATIONS   Current Outpatient Medications   Medication Sig Dispense Refill    pregabalin (LYRICA) 200 MG capsule TAKE 1 CAPSULE BY MOUTH TWICE DAILY 60 capsule 5    bumetanide (BUMEX) 1 MG tablet Take 1 tablet by mouth 2 times daily 30 tablet 3    potassium chloride (KLOR-CON M) 20 MEQ extended release tablet Take 1 tablet by mouth 3 times daily (with meals) (Patient taking differently: Take 20 mEq by mouth daily ) 60 tablet 3    doxepin (SINEQUAN) 10 MG capsule TAKE 1 CAPSULE BY MOUTH EVERY NIGHT 30 capsule 5    atorvastatin (LIPITOR) 40 MG tablet Take 1 tablet by mouth daily      LORazepam (ATIVAN) 0.5 MG tablet as needed. 1    pseudoephedrine (SUDAFED) 30 MG tablet Take 30 mg by mouth every 4 hours as needed for Congestion      aspirin 81 MG tablet Take 81 mg by mouth daily      RA VITAMIN D-3 2000 UNITS CAPS 1 capsule daily       DYMISTA 137-50 MCG/ACT SUSP as needed       SUMAtriptan (IMITREX) 100 MG tablet Take 1 tablet by mouth once as needed for Migraine 6 tablet 2     No current facility-administered medications for this visit. Review of Systems  Constitutional: No fever, no chills, no diaphoresis, no generalized weakness. HEENT: No blurred vision, No sore throat, no ear pain, no hair loss  Neck: denied any neck swelling, difficulty swallowing,   Cadrdiopulomary: No CP, SOB or palpitation, No orthopnea or PND. No cough or wheezing.   GI: No N/V/D, no constipation, No abdominal pain, no melena or hematochezia   : Denied any dysuria, hematuria, flank pain, discharge, or incontinence. Skin: denied any rash, ulcer, Hirsute, or hyperpigmentation. MSK: denied any joint deformity, joint pain/swelling, muscle pain, or back pain. Neuro: no numbess, no tingling, no weakness,     OBJECTIVE    /82   Pulse 80   Temp 98.7 °F (37.1 °C)   Resp 16   Ht 5' 8\" (1.727 m)   Wt 230 lb 3.2 oz (104.4 kg)   SpO2 95%   BMI 35.00 kg/m²   BP Readings from Last 4 Encounters:   10/13/20 136/82   07/28/20 (!) 144/78   10/02/19 117/67   03/25/19 126/82     Wt Readings from Last 6 Encounters:   10/13/20 230 lb 3.2 oz (104.4 kg)   07/28/20 249 lb 12.8 oz (113.3 kg)   10/02/19 220 lb 11.2 oz (100.1 kg)   03/25/19 231 lb (104.8 kg)   09/20/18 223 lb 6.4 oz (101.3 kg)   06/06/18 211 lb (95.7 kg)       Physical examination:  General: awake alert, oriented x3, no abnormal position or movements. HEENT: normocephalic non traumatic, no exophthalmos, no lid lag, no lid retraction   Neck: supple, no LN enlargement, no thyromegaly, no thyroid tenderness, no thyroid bruit, no JVD. Pulm: Clear equal air entry no added sounds, no wheezing or rhonchi    CVS: S1 + S2, no murmur, no heave. Dorsalis pedis pulse palpable   Abd: soft lax, no tenderness, no organomegaly, audible bowel sounds. Skin: warm, no lesions, no rash.  No palmar erythema, no onycholysis, no pretibial Myxoedema, no acropachy   Musculoskeletal: No back tenderness, no kyphosis/scoliosis    Neuro: CN intact, sensation notmal , muscle power normal. Fine tremors   Psych: normal mood, and affect    Review of Laboratory Data:  I have reviewed the following:  Lab Results   Component Value Date/Time    WBC 4.2 (L) 08/21/2020 11:02 AM    RBC 5.24 08/21/2020 11:02 AM    HGB 13.6 08/21/2020 11:02 AM    HCT 42.9 08/21/2020 11:02 AM    MCV 81.9 08/21/2020 11:02 AM    MCH 26.0 08/21/2020 11:02 AM    MCHC 31.7 (L) 08/21/2020 11:02 AM RDW 14.6 08/21/2020 11:02 AM     08/21/2020 11:02 AM    MPV 11.2 08/21/2020 11:02 AM      Lab Results   Component Value Date/Time     07/28/2020 04:24 AM    K 4.2 07/28/2020 04:24 AM    CO2 24 07/28/2020 04:24 AM    BUN 14 07/28/2020 04:24 AM    CREATININE 0.6 07/28/2020 04:24 AM    CALCIUM 9.6 07/28/2020 04:24 AM    LABGLOM >60 07/28/2020 04:24 AM    GFRAA >60 07/28/2020 04:24 AM      Lab Results   Component Value Date/Time    TSH 1.020 07/18/2016 12:20 PM    T4FREE 1.36 07/18/2016 12:20 PM    K8ITXYR 16.6 (H) 05/22/2015 01:05 PM    FT3 3.5 07/18/2016 12:20 PM     Lab Results   Component Value Date    LABA1C 5.7 11/10/2016    GLUCOSE 114 07/28/2020    GLUCOSE 80 04/27/2011     Lab Results   Component Value Date    TRIG 81 11/10/2016    HDL 65 11/10/2016    LDLCALC 177 11/10/2016    CHOL 258 11/10/2016     No results found for: [facility] [address] Records/Labs/Images Review:   I personally reviewed and summarized previous records   All labs and imaging were reviewed independently    Sean Webb, a 60 y.o.-old female seen in for management of following issues      Hyperthyroidism  · Currently not on treatment   · Check TSH, free T4, total T3, TSI, TPO-Ab, Tg-Ab   · Will also order thyroid ultrasound   · Will follow results and proceed accordingly   · If level come back high, will likely start methimazole. Reviewed potential rare but serious side effects of Methimazole, including agranulocytosis and liver dysfunction (cholestasis). Bone health/vitD deficiency    · Discussed the effect of hyperthyroidism on bone health  · Encourage taking vitD 2000 iu/day over the counter    Return in about 3 months (around 1/13/2021) for Hyperthytoidism . The above issues were reviewed with the patient who understood and agreed with the plan. Thank you for allowing us to participate in the care of this patient. Please do not hesitate to contact us with any additional questions. Diagnosis Orders   1. Hyperthyroidism  TSH without Reflex    T4, Free    T4    T3    US THYROID    Thyroid AB    Thyroid Stimulating Immunoglobulin     Dary Charles MD  Endocrinologist, [facility] Diabetes Care and Endocrinology   [address] [facility], [address] [place] [address],[address] [id number]   Phone: [phone]  Fax: [phone]  ---------------------------------  An electronic signature was used to authenticate this note.  Joseph Marx MD on 10/13/2020 at 7:58 PM

## 2020-10-17 PROBLEM — E55.9 VITAMIN D DEFICIENCY: Status: ACTIVE | Noted: 2020-10-17

## 2020-10-22 ENCOUNTER — HOSPITAL ENCOUNTER (OUTPATIENT)
Dept: ULTRASOUND IMAGING | Age: 65
Discharge: HOME | End: 2020-10-24
Payer: MEDICARE

## 2020-10-22 ENCOUNTER — HOSPITAL ENCOUNTER (OUTPATIENT)
Age: 65
Discharge: HOME | End: 2020-10-22
Payer: MEDICARE

## 2020-10-22 LAB
T3 TOTAL: 245.9 NG/DL (ref 80–200)
T4 FREE: 1.96 NG/DL (ref 0.93–1.7)
T4 TOTAL: 14.6 MCG/DL (ref 4.5–11.7)
TSH SERPL DL<=0.05 MIU/L-ACNC: <0.01 UIU/ML (ref 0.27–4.2)

## 2020-10-22 PROCEDURE — 76536 US EXAM OF HEAD AND NECK: CPT

## 2020-10-22 PROCEDURE — 86376 MICROSOMAL ANTIBODY EACH: CPT

## 2020-10-22 PROCEDURE — 84439 ASSAY OF FREE THYROXINE: CPT

## 2020-10-22 PROCEDURE — 36415 COLL VENOUS BLD VENIPUNCTURE: CPT

## 2020-10-22 PROCEDURE — 84445 ASSAY OF TSI GLOBULIN: CPT

## 2020-10-22 PROCEDURE — 84480 ASSAY TRIIODOTHYRONINE (T3): CPT

## 2020-10-22 PROCEDURE — 86800 THYROGLOBULIN ANTIBODY: CPT

## 2020-10-22 PROCEDURE — 84443 ASSAY THYROID STIM HORMONE: CPT

## 2020-10-23 ENCOUNTER — TELEPHONE (OUTPATIENT)
Dept: ENDOCRINOLOGY | Age: 65
End: 2020-10-23

## 2020-10-23 RX ORDER — METHIMAZOLE 10 MG/1
TABLET ORAL
Qty: 60 TABLET | Refills: 5 | Status: SHIPPED
Start: 2020-10-23 | End: 2021-01-18 | Stop reason: SDUPTHER

## 2020-10-25 LAB
THYROGLOBULIN AB: <0.9 IU/ML (ref 0–4)
THYROID PEROXIDASE (TPO) ABS: 284.6 IU/ML (ref 0–9)
THYROID STIMULATING IMMUNOGLOBULIN: 3.69 IU/L

## 2020-11-23 ENCOUNTER — TELEPHONE (OUTPATIENT)
Dept: ENDOCRINOLOGY | Age: 65
End: 2020-11-23

## 2020-11-23 ENCOUNTER — HOSPITAL ENCOUNTER (OUTPATIENT)
Age: 65
Discharge: HOME | End: 2020-11-23
Payer: MEDICARE

## 2020-11-23 LAB
T4 FREE: 1.04 NG/DL (ref 0.93–1.7)
TSH SERPL DL<=0.05 MIU/L-ACNC: 0.02 UIU/ML (ref 0.27–4.2)

## 2020-11-23 PROCEDURE — 84443 ASSAY THYROID STIM HORMONE: CPT

## 2020-11-23 PROCEDURE — 36415 COLL VENOUS BLD VENIPUNCTURE: CPT

## 2020-11-23 PROCEDURE — 84439 ASSAY OF FREE THYROXINE: CPT

## 2020-11-24 NOTE — TELEPHONE ENCOUNTER
Notify pt,  I have reviewed your recent results    Thyroid hormones are much better now.  Continue current dose of Methimazole     Repeat labs few days before next visit in January     plz check if she needs refills

## 2021-01-04 RX ORDER — DOXEPIN HYDROCHLORIDE 10 MG/1
CAPSULE ORAL
Qty: 90 CAPSULE | Refills: 3 | Status: SHIPPED
Start: 2021-01-04 | End: 2022-02-23

## 2021-01-12 ENCOUNTER — HOSPITAL ENCOUNTER (OUTPATIENT)
Age: 66
Discharge: HOME | End: 2021-01-12
Payer: MEDICARE

## 2021-01-12 DIAGNOSIS — E05.90 HYPERTHYROIDISM: ICD-10-CM

## 2021-01-12 LAB
T4 FREE: 0.87 NG/DL (ref 0.93–1.7)
TSH SERPL DL<=0.05 MIU/L-ACNC: 3.29 UIU/ML (ref 0.27–4.2)

## 2021-01-12 PROCEDURE — 84443 ASSAY THYROID STIM HORMONE: CPT

## 2021-01-12 PROCEDURE — 84439 ASSAY OF FREE THYROXINE: CPT

## 2021-01-12 PROCEDURE — 36415 COLL VENOUS BLD VENIPUNCTURE: CPT

## 2021-01-18 ENCOUNTER — VIRTUAL VISIT (OUTPATIENT)
Dept: ENDOCRINOLOGY | Age: 66
End: 2021-01-18
Payer: MEDICARE

## 2021-01-18 DIAGNOSIS — E55.9 VITAMIN D DEFICIENCY: ICD-10-CM

## 2021-01-18 DIAGNOSIS — E05.90 HYPERTHYROIDISM: Primary | ICD-10-CM

## 2021-01-18 PROCEDURE — 99214 OFFICE O/P EST MOD 30 MIN: CPT | Performed by: INTERNAL MEDICINE

## 2021-01-18 RX ORDER — METHIMAZOLE 10 MG/1
TABLET ORAL
Qty: 45 TABLET | Refills: 8 | Status: SHIPPED
Start: 2021-01-18 | End: 2021-02-28

## 2021-01-18 NOTE — PROGRESS NOTES
Luana Louis was read the following message We want to confirm that, for purposes of billing, this is a virtual visit with your provider for which we will submit a claim for reimbursement with your insurance company. You will be responsible for any copays, coinsurance amounts or other amounts not covered by your insurance company. If you do not accept this, unfortunately we will not be able to schedule or proceed with a virtual visit with the provider. Do you accept? Helga responded Yes .

## 2021-01-18 NOTE — PROGRESS NOTES
[address] [place] [address] Department of Endocrinology Diabetes and Metabolism   [address] [facility] [address]   Phone: [phone]  Fax: [phone]    Date of Service: 1/18/2021    Primary Care Physician: Radha Richter MD  Provider: July De Santiago MD    Reason for the visit:  Hyperthyroidism    History of Present Illness: The history is provided by the patient. No  was used. Accuracy of the patient data is excellent. Roberto Wang is a very pleasant 63 y.o. female seen today for evaluation and management of hyperthyroidism     The patient was diagnosed with hyperthyroidism in 2015. At that time pt was started on medication but she didn't feel good on it and self discontinued it     She is currently on Methimazole 10 mg BID  Lab Results   Component Value Date/Time    TSH 3.290 01/12/2021 11:50 AM    T4FREE 0.87 (L) 01/12/2021 11:50 AM     Patient reported few episodes of palpitations, but denied swelling in the area of the thyroid gland, weight loss, change in appetite, nervousness, anxiety, irritability, tremor, sweating, heat intolerance, changes in bowel habits, muscle weakness or difficulty sleeping. PAST MEDICAL HISTORY   Past Medical History:   Diagnosis Date    GERD (gastroesophageal reflux disease)     History of colonoscopy 2006    neg    Trigeminal neuralgia        PAST SURGICAL HISTORY   Past Surgical History:   Procedure Laterality Date    APPENDECTOMY      1972    BRAIN SURGERY      CHOLECYSTECTOMY      1996    DILATION AND CURETTAGE      9/07    FINGER TRIGGER RELEASE      JOINT REPLACEMENT Left     Left hip    TUBAL LIGATION      1986       SOCIAL HISTORY   Tobacco:   reports that she quit smoking about 17 years ago. She has never used smokeless tobacco.  Alcohol:   reports no history of alcohol use. Drugs:   reports no history of drug use.     FAMILY HISTORY   Family History   Problem Relation Age of Onset    Diabetes Maternal Grandmother     Stroke Maternal Grandmother     Hypertension Maternal Grandmother     High Cholesterol Maternal Grandmother     Heart Disease Maternal Grandmother     Osteoporosis Maternal Grandmother     Heart Disease Father     Hypertension Mother     High Cholesterol Mother     Osteoporosis Mother     Sudden Death Mother        ALLERGIES AND DRUG REACTIONS   No Known Allergies    CURRENT MEDICATIONS   Current Outpatient Medications   Medication Sig Dispense Refill    doxepin (SINEQUAN) 10 MG capsule take 1 capsule by mouth EVERY NIGHT 90 capsule 3    methIMAzole (TAPAZOLE) 10 MG tablet Take 1 tablet twice a day 60 tablet 5    pregabalin (LYRICA) 200 MG capsule TAKE 1 CAPSULE BY MOUTH TWICE DAILY 60 capsule 5    bumetanide (BUMEX) 1 MG tablet Take 1 tablet by mouth 2 times daily 30 tablet 3    potassium chloride (KLOR-CON M) 20 MEQ extended release tablet Take 1 tablet by mouth 3 times daily (with meals) (Patient taking differently: Take 20 mEq by mouth daily ) 60 tablet 3    atorvastatin (LIPITOR) 40 MG tablet Take 1 tablet by mouth daily      LORazepam (ATIVAN) 0.5 MG tablet as needed. 1    pseudoephedrine (SUDAFED) 30 MG tablet Take 30 mg by mouth every 4 hours as needed for Congestion      aspirin 81 MG tablet Take 81 mg by mouth daily      RA VITAMIN D-3 2000 UNITS CAPS 1 capsule daily       DYMISTA 137-50 MCG/ACT SUSP as needed       SUMAtriptan (IMITREX) 100 MG tablet Take 1 tablet by mouth once as needed for Migraine 6 tablet 2     No current facility-administered medications for this visit. Review of Systems  Constitutional: No fever, no chills, no diaphoresis, no generalized weakness. HEENT: No blurred vision, No sore throat, no ear pain, no hair loss  Neck: denied any neck swelling, difficulty swallowing,   Cadrdiopulomary: No CP, SOB or palpitation, No orthopnea or PND. No cough or wheezing.   GI: No N/V/D, no constipation, No abdominal pain, no melena or hematochezia : Denied any dysuria, hematuria, flank pain, discharge, or incontinence. Skin: denied any rash, ulcer, Hirsute, or hyperpigmentation. MSK: denied any joint deformity, joint pain/swelling, muscle pain, or back pain. Neuro: no numbess, no tingling, no weakness,     OBJECTIVE    There were no vitals taken for this visit. BP Readings from Last 4 Encounters:   10/13/20 136/82   07/28/20 (!) 144/78   10/02/19 117/67   03/25/19 126/82     Wt Readings from Last 6 Encounters:   10/13/20 230 lb 3.2 oz (104.4 kg)   07/28/20 249 lb 12.8 oz (113.3 kg)   10/02/19 220 lb 11.2 oz (100.1 kg)   03/25/19 231 lb (104.8 kg)   09/20/18 223 lb 6.4 oz (101.3 kg)   06/06/18 211 lb (95.7 kg)     Physical examination:  Due to this being a TeleHealth encounter, evaluation of the following organ systems is limited: Vitals/Constitutional/EENT/Resp/CV/GI//MS/Neuro/Skin/Heme-Lymph-Imm. Modified physical exam through Telemedicine camera    General: Communicating well via camera   Neck: no obvious neck mass. No obvious neck deformity     CVS: no distress   Chest: no distress. Chest is moving with respiration    Extremities:  no visible tremor  Skin: No visible rashes as seen from camera   Musculoskeletal: no visible deformity  Neuro: Alert and oriented to person, place, and time. Psychiatric: Normal mood and affect.  Behavior is normal    Review of Laboratory Data:  I have reviewed the following:  Lab Results   Component Value Date/Time    WBC 4.2 (L) 08/21/2020 11:02 AM    RBC 5.24 08/21/2020 11:02 AM    HGB 13.6 08/21/2020 11:02 AM    HCT 42.9 08/21/2020 11:02 AM    MCV 81.9 08/21/2020 11:02 AM    MCH 26.0 08/21/2020 11:02 AM    MCHC 31.7 (L) 08/21/2020 11:02 AM    RDW 14.6 08/21/2020 11:02 AM     08/21/2020 11:02 AM    MPV 11.2 08/21/2020 11:02 AM      Lab Results   Component Value Date/Time     07/28/2020 04:24 AM    K 4.2 07/28/2020 04:24 AM    CO2 24 07/28/2020 04:24 AM    BUN 14 07/28/2020 04:24 AM    CREATININE 0.6 07/28/2020 04:24 AM    CALCIUM 9.6 07/28/2020 04:24 AM    LABGLOM >60 07/28/2020 04:24 AM    GFRAA >60 07/28/2020 04:24 AM      Lab Results   Component Value Date/Time    TSH 3.290 01/12/2021 11:50 AM    T4FREE 0.87 (L) 01/12/2021 11:50 AM    H1QPHID 14.6 (H) 10/22/2020 12:32 PM    FT3 3.5 07/18/2016 12:20 PM    C7YXVBT 245.90 (H) 10/22/2020 12:32 PM    TSI 3.69 (H) 10/22/2020 12:32 PM    TPOABS 284.6 (H) 10/22/2020 12:32 PM    THGAB <0.9 10/22/2020 12:32 PM     Lab Results   Component Value Date    LABA1C 5.7 11/10/2016    GLUCOSE 114 07/28/2020    GLUCOSE 80 04/27/2011     Lab Results   Component Value Date    TRIG 81 11/10/2016    HDL 65 11/10/2016    [facility] 177 11/10/2016    CHOL 258 11/10/2016     No results found for: Rosaline, a 63 y.o.-old female seen in for management of following issues      Hyperthyroidism  · Decrease Methimazole to 15 mg daily   · Check TSH, free T4 in 6 wks   · Will follow results and proceed accordingly   · Reviewed potential side effects of Methimazole    HLD  · Continue Lipitor 40 mg daily     VitD deficiency    · Discussed the effect of hyperthyroidism on bone health  · Encouraged vitD supplement     I personally reviewed external notes from PCP and other patient's care team providers, and personally interpreted labs associated with the above diagnosis. I also ordered labs to further assess and manage the above addressed medical conditions    Return in about 6 months (around 7/18/2021) for Hyoerthyroismn . The above issues were reviewed with the patient who understood and agreed with the plan. Thank you for allowing us to participate in the care of this patient. Please do not hesitate to contact us with any additional questions. Diagnosis Orders   1. Hyperthyroidism  methIMAzole (TAPAZOLE) 10 MG tablet    TSH without Reflex    T4, Free   2.  Vitamin D deficiency       Kamar Perera MD  Endocrinologist, [facility] Diabetes Care and Endocrinology   [address] [place] [address]   Phone: [phone]  Fax: [phone]  ---------------------------------  An electronic signature was used to authenticate this note.  Epi Hill MD on 1/18/2021 at 12:28 PM

## 2021-02-11 ENCOUNTER — VIRTUAL VISIT (OUTPATIENT)
Dept: NEUROLOGY | Age: 66
End: 2021-02-11
Payer: MEDICARE

## 2021-02-11 DIAGNOSIS — G50.1 ATYPICAL FACIAL PAIN: ICD-10-CM

## 2021-02-11 PROCEDURE — 99443 PR PHYS/QHP TELEPHONE EVALUATION 21-30 MIN: CPT | Performed by: CLINICAL NURSE SPECIALIST

## 2021-02-11 RX ORDER — PREGABALIN 200 MG/1
CAPSULE ORAL
Qty: 60 CAPSULE | Refills: 5 | Status: SHIPPED
Start: 2021-02-11 | End: 2021-08-16

## 2021-02-11 RX ORDER — LORATADINE 10 MG/1
10 TABLET ORAL DAILY
COMMUNITY

## 2021-02-11 NOTE — PROGRESS NOTES
Jessica Keen was read the following message We want to confirm that, for purposes of billing, this is a virtual visit with your provider for which we will submit a claim for reimbursement with your insurance company. You will be responsible for any copays, coinsurance amounts or other amounts not covered by your insurance company. If you do not accept this, unfortunately we will not be able to schedule or proceed with a virtual visit with the provider. Do you accept? Helga responded Yes .

## 2021-02-11 NOTE — PROGRESS NOTES
Dick Carrizales is a 63 y.o. female evaluated via telephone on 2/11/2021. Consent:  She and/or health care decision maker is aware that that she may receive a bill for this telephone service, depending on her insurance coverage, and has provided verbal consent to proceed: Yes  I affirm this is a Patient Initiated Episode with an Established Patient who has not had a related appointment within my department in the past 7 days or scheduled within the next 24 hours. Total Time: minutes: 21-30 minutes    Patient advised regarding steps to help prevent the spread of COVID-19   SOURCE - https://heriberto-holloway.info/. html   1-Stay home except to get medical care  2-Clean your hands often for atleast 20 secnds, avoid touching: Avoid touching your eyes, nose, and mouth with unwashed hands. 3-Seek medical attention: Seek prompt medical attention if your illness is worsening (e.g., difficulty breathing).   Call you doctor first.  3-Wear a facemask if you are sick   4-Cover your coughs and sneezes      Note: not billable if this call serves to triage the patient into an appointment for the relevant concern  Date of Telephone Visit:   4/8/20   Consent:  The patient and/or health care decision maker is aware that that he may receive a bill for this telephone service, depending on his insurance coverage, and has provided verbal consent to proceed: Yes    Long history of atypical facial pain following a bout with trigeminal neuralgia   A presumed infection triggering her issues   Tried gabapentin and baclofen without improvement   Responded to Lyrica at high doses quite well and recently, Doxepin (per Dr Matthew Ch)      In June 2016 she had the flu for about 3 days   Shortly afterwards, developed severe headache -- seen in ER, diagnosed with CVT   Transferred to [facility] -- underwent clot retrieval and recovered well    Felt she was severely dehydrated from influenza    Left with short-term memory deficits and now no longer working at the [employer]     denies visual issues - no double vision or loss of vision   No nausea or vomiting    Remains off AC   Was previously started on warfarin -- developed bleeding and it was discontinued by [facility]    Now on daily baby Aspirin     No chest pain or palpitations  No SOB  No vertigo, lightheadedness or loss of consciousness  No falls, tripping or stumbling  No incontinence of bowels or bladder  No itching or bruising appreciated  No numbness, tingling or focal arm/leg weakness    ROS otherwise negative     Prior to Visit Medications    Medication Sig Taking? Authorizing Provider   loratadine (CLARITIN) 10 MG tablet Take 10 mg by mouth daily Yes Historical Provider, MD   pregabalin (LYRICA) 200 MG capsule TAKE 1 CAPSULE BY MOUTH TWICE DAILY Yes TERENCE Brink   methIMAzole (TAPAZOLE) 10 MG tablet Take 1.5 tablet (15 mg) daily Yes Epi Hill MD   doxepin (SINEQUAN) 10 MG capsule take 1 capsule by mouth EVERY NIGHT Yes TERENCE Brink   bumetanide (BUMEX) 1 MG tablet Take 1 tablet by mouth 2 times daily Yes Mary Horner MD   potassium chloride (KLOR-CON M) 20 MEQ extended release tablet Take 1 tablet by mouth 3 times daily (with meals)  Patient taking differently: Take 20 mEq by mouth daily  Yes Mary Horner MD   SUMAtriptan (IMITREX) 100 MG tablet Take 1 tablet by mouth once as needed for Migraine Yes TERENCE Brink   atorvastatin (LIPITOR) 40 MG tablet Take 1 tablet by mouth daily Yes Historical Provider, MD   LORazepam (ATIVAN) 0.5 MG tablet as needed.   Yes Historical Provider, MD   aspirin 81 MG tablet Take 81 mg by mouth daily Yes Historical Provider, MD JACOBO VITAMIN D-3 2000 UNITS CAPS 1 capsule daily  Yes Historical Provider, MD   DYMISTA 137-50 MCG/ACT SUSP as needed  Yes Historical Provider, MD     Allergies as of 02/11/2021    (No Known Allergies)     Objective:     Mental Status: alert and oriented to person place and time    Speech: clear  Language: normal with no anomia today     Laboratory/Radiology:     CBC:   Lab Results   Component Value Date    WBC 4.2 08/21/2020    RBC 5.24 08/21/2020    HGB 13.6 08/21/2020    HCT 42.9 08/21/2020    MCV 81.9 08/21/2020    MCH 26.0 08/21/2020    MCHC 31.7 08/21/2020    RDW 14.6 08/21/2020     08/21/2020    MPV 11.2 08/21/2020     BMP:    Lab Results   Component Value Date     07/28/2020    K 4.2 07/28/2020     07/28/2020    CO2 24 07/28/2020    BUN 14 07/28/2020    LABALBU 4.2 11/10/2016    LABALBU 4.0 04/27/2011    CREATININE 0.6 07/28/2020    CALCIUM 9.6 07/28/2020    GFRAA >60 07/28/2020    LABGLOM >60 07/28/2020    GLUCOSE 114 07/28/2020    GLUCOSE 80 04/27/2011     CT Head June 2018    Unremarkable     Carotid US March 2019   No significant stenosis     Labs and dx test personally reviewed at this appt     Assessment:     Atypical facial pain   Improved with Lyrica at current dosage and Doxepin     Gabapentin and baclofen was not helpful in the past    Migraine headache -- rare    Sleep deprivation   May need to consider r/o CYNDEE in future - states previous sleep studies unremarkable     clot retrieval for CVT   Doing well neurologically with minimal cognitive, word-finding issues     Plan:     Continue Lyrica 200 BID and Doxepin at 10mg daily   New script provided     Emily Becerril  9:49 AM  2/11/2021

## 2021-02-26 ENCOUNTER — HOSPITAL ENCOUNTER (OUTPATIENT)
Age: 66
Discharge: HOME | End: 2021-02-26
Payer: MEDICARE

## 2021-02-26 DIAGNOSIS — E05.90 HYPERTHYROIDISM: ICD-10-CM

## 2021-02-26 LAB
T4 FREE: 0.88 NG/DL (ref 0.93–1.7)
TSH SERPL DL<=0.05 MIU/L-ACNC: 6.06 UIU/ML (ref 0.27–4.2)

## 2021-02-26 PROCEDURE — 84443 ASSAY THYROID STIM HORMONE: CPT

## 2021-02-26 PROCEDURE — 84439 ASSAY OF FREE THYROXINE: CPT

## 2021-02-26 PROCEDURE — 36415 COLL VENOUS BLD VENIPUNCTURE: CPT

## 2021-02-28 ENCOUNTER — TELEPHONE (OUTPATIENT)
Dept: ENDOCRINOLOGY | Age: 66
End: 2021-02-28

## 2021-02-28 DIAGNOSIS — E05.90 HYPERTHYROIDISM: Primary | ICD-10-CM

## 2021-02-28 RX ORDER — METHIMAZOLE 5 MG/1
TABLET ORAL
Qty: 30 TABLET | Refills: 5 | Status: SHIPPED | OUTPATIENT
Start: 2021-02-28 | End: 2021-08-16

## 2021-02-28 NOTE — TELEPHONE ENCOUNTER
Notify pt,  I have reviewed your recent results    Thyroid hormones are low which means you are responding very well to Methimazole     I recommend decrease Methimazole from 15 mg daily to 5 mg daily and repeat labs in 6-8 wks

## 2021-04-23 ENCOUNTER — HOSPITAL ENCOUNTER (OUTPATIENT)
Age: 66
Discharge: HOME | End: 2021-04-23
Payer: MEDICARE

## 2021-04-23 DIAGNOSIS — E05.90 HYPERTHYROIDISM: ICD-10-CM

## 2021-04-23 LAB
T4 FREE: 1.1 NG/DL (ref 0.93–1.7)
T4 TOTAL: 10.3 MCG/DL (ref 4.5–11.7)
TSH SERPL DL<=0.05 MIU/L-ACNC: 2.72 UIU/ML (ref 0.27–4.2)

## 2021-04-23 PROCEDURE — 84439 ASSAY OF FREE THYROXINE: CPT

## 2021-04-23 PROCEDURE — 84443 ASSAY THYROID STIM HORMONE: CPT

## 2021-04-23 PROCEDURE — 36415 COLL VENOUS BLD VENIPUNCTURE: CPT

## 2021-04-25 ENCOUNTER — TELEPHONE (OUTPATIENT)
Dept: ENDOCRINOLOGY | Age: 66
End: 2021-04-25

## 2021-04-25 DIAGNOSIS — E05.90 HYPERTHYROIDISM: Primary | ICD-10-CM

## 2021-04-26 NOTE — TELEPHONE ENCOUNTER
Notify pt,  I have reviewed your recent results    Great!, thyroid hormones results are within an acceptable range of normal. There is no need for a change in your therapy at this time.     Repeat labs before next visit in July

## 2021-05-05 LAB
BUN BLDV-MCNC: NORMAL MG/DL
CALCIUM SERPL-MCNC: 9.2 MG/DL
CHLORIDE BLD-SCNC: 105 MMOL/L
CO2: NORMAL
CREAT SERPL-MCNC: 0.83 MG/DL
GFR CALCULATED: NORMAL
GLUCOSE BLD-MCNC: NORMAL MG/DL
POTASSIUM SERPL-SCNC: 3.9 MMOL/L
SODIUM BLD-SCNC: 141 MMOL/L
T4 FREE: 1
TSH SERPL DL<=0.05 MIU/L-ACNC: 3.2 UIU/ML
VITAMIN D 25-HYDROXY: NORMAL
VITAMIN D2, 25 HYDROXY: NORMAL
VITAMIN D3,25 HYDROXY: NORMAL

## 2021-07-23 ENCOUNTER — HOSPITAL ENCOUNTER (OUTPATIENT)
Age: 66
Discharge: HOME | End: 2021-07-23
Payer: MEDICARE

## 2021-07-23 DIAGNOSIS — E05.90 HYPERTHYROIDISM: ICD-10-CM

## 2021-07-23 LAB
T4 FREE: 1.29 NG/DL (ref 0.93–1.7)
TSH SERPL DL<=0.05 MIU/L-ACNC: 4.17 UIU/ML (ref 0.27–4.2)

## 2021-07-23 PROCEDURE — 84439 ASSAY OF FREE THYROXINE: CPT

## 2021-07-23 PROCEDURE — 84443 ASSAY THYROID STIM HORMONE: CPT

## 2021-07-23 PROCEDURE — 36415 COLL VENOUS BLD VENIPUNCTURE: CPT

## 2021-08-03 ENCOUNTER — VIRTUAL VISIT (OUTPATIENT)
Dept: ENDOCRINOLOGY | Age: 66
End: 2021-08-03
Payer: MEDICARE

## 2021-08-03 DIAGNOSIS — E55.9 VITAMIN D DEFICIENCY: ICD-10-CM

## 2021-08-03 DIAGNOSIS — E05.90 HYPERTHYROIDISM: Primary | ICD-10-CM

## 2021-08-03 PROCEDURE — 99214 OFFICE O/P EST MOD 30 MIN: CPT | Performed by: INTERNAL MEDICINE

## 2021-08-03 RX ORDER — METHYLPREDNISOLONE 4 MG/1
TABLET ORAL
COMMUNITY
Start: 2021-08-02

## 2021-08-03 NOTE — PROGRESS NOTES
Shannan Palomares was read the following message We want to confirm that, for purposes of billing, this is a virtual visit with your provider for which we will submit a claim for reimbursement with your insurance company. You will be responsible for any copays, coinsurance amounts or other amounts not covered by your insurance company. If you do not accept this, unfortunately we will not be able to schedule or proceed with a virtual visit with the provider. Do you accept? Helga responded Yes .

## 2021-08-14 DIAGNOSIS — G50.1 ATYPICAL FACIAL PAIN: ICD-10-CM

## 2021-08-16 DIAGNOSIS — G50.1 ATYPICAL FACIAL PAIN: ICD-10-CM

## 2021-08-16 RX ORDER — PREGABALIN 200 MG/1
CAPSULE ORAL
Qty: 60 CAPSULE | Refills: 5 | OUTPATIENT
Start: 2021-08-16 | End: 2022-01-16

## 2021-08-16 RX ORDER — PREGABALIN 200 MG/1
CAPSULE ORAL
Qty: 60 CAPSULE | Refills: 5 | Status: SHIPPED
Start: 2021-08-16 | End: 2022-02-23 | Stop reason: SDUPTHER

## 2021-08-16 NOTE — TELEPHONE ENCOUNTER
Patient called in stating that she tried to fill her Lyrica but was told that her prescription . She took her last one today.

## 2021-08-25 ENCOUNTER — VIRTUAL VISIT (OUTPATIENT)
Dept: NEUROLOGY | Age: 66
End: 2021-08-25
Payer: MEDICARE

## 2021-08-25 DIAGNOSIS — G43.009 MIGRAINE WITHOUT AURA AND WITHOUT STATUS MIGRAINOSUS, NOT INTRACTABLE: ICD-10-CM

## 2021-08-25 DIAGNOSIS — G08 CEREBRAL VENOUS THROMBOSIS: ICD-10-CM

## 2021-08-25 DIAGNOSIS — G50.1 ATYPICAL FACIAL PAIN: Primary | ICD-10-CM

## 2021-08-25 PROCEDURE — 99442 PR PHYS/QHP TELEPHONE EVALUATION 11-20 MIN: CPT | Performed by: CLINICAL NURSE SPECIALIST

## 2021-08-25 RX ORDER — UBROGEPANT 100 MG/1
100 TABLET ORAL PRN
Qty: 8 TABLET | Refills: 2 | Status: SHIPPED | OUTPATIENT
Start: 2021-08-25

## 2021-08-25 NOTE — PROGRESS NOTES
Reilly Hawley is a 63 y.o. female evaluated via telephone on 8/25/2021. Consent:  She and/or health care decision maker is aware that that she may receive a bill for this telephone service, depending on her insurance coverage, and has provided verbal consent to proceed: Yes  I affirm this is a Patient Initiated Episode with an Established Patient who has not had a related appointment within my department in the past 7 days or scheduled within the next 24 hours. Total Time: minutes: 21-30 minutes    Patient advised regarding steps to help prevent the spread of COVID-19   SOURCE - [email or web address].info/. html   1-Stay home except to get medical care  2-Clean your hands often for atleast 20 secnds, avoid touching: Avoid touching your eyes, nose, and mouth with unwashed hands. 3-Seek medical attention: Seek prompt medical attention if your illness is worsening (e.g., difficulty breathing).   Call you doctor first.  3-Wear a facemask if you are sick   4-Cover your coughs and sneezes      Note: not billable if this call serves to triage the patient into an appointment for the relevant concern  Date of Telephone Visit:   4/8/20   Consent:  The patient and/or health care decision maker is aware that that he may receive a bill for this telephone service, depending on his insurance coverage, and has provided verbal consent to proceed: Yes    Long history of atypical facial pain following a bout with trigeminal neuralgia   A presumed infection triggering her issues   Tried gabapentin and baclofen without improvement   Responded to Lyrica at high doses quite well and Doxepin (per Dr Martinez Woodruff)     She did suffer a severe migraine few weeks ago which seemed the last 1 to 3 days  She utilize an old prescription for Imitrex-we did discuss a safety at this time given her history of CVT I would switch her to a CGRP agent   Will try [facility] at this time    She is concerned she has done undergo cold sculpting this weekend and fearful that will irritate her trigeminal neuralgia  He did discuss the risks she will keep me posted next week how it goes     In June 2016 she had the flu for about 3 days   Shortly afterwards, developed severe headache -- seen in ER, diagnosed with CVT   Transferred to [facility] -- underwent clot retrieval and recovered well    Felt she was severely dehydrated from influenza    Left with short-term memory deficits and now no longer working at the [employer]     denies visual issues - no double vision or loss of vision   No nausea or vomiting    Remains off AC   Was previously started on warfarin -- developed bleeding and it was discontinued by [facility]    Now on daily baby Aspirin     No chest pain or palpitations  No SOB  No vertigo, lightheadedness or loss of consciousness  No falls, tripping or stumbling  No incontinence of bowels or bladder  No itching or bruising appreciated  No numbness, tingling or focal arm/leg weakness    ROS otherwise negative     Prior to Visit Medications    Medication Sig Taking?  Authorizing Provider   Ubrogepant (UBRELVY) 100 MG TABS Take 100 mg by mouth as needed (migraine) Yes TERENCE Duarte   pregabalin (LYRICA) 200 MG capsule take 1 capsule by mouth twice a day Yes TERENCE Duarte   methIMAzole (TAPAZOLE) 5 MG tablet take 1 tablet by mouth once daily Yes Joseph Chapman MD   loratadine (CLARITIN) 10 MG tablet Take 10 mg by mouth daily Yes Historical Provider, MD   doxepin (SINEQUAN) 10 MG capsule take 1 capsule by mouth EVERY NIGHT Yes TERENCE Duarte   bumetanide (BUMEX) 1 MG tablet Take 1 tablet by mouth 2 times daily Yes Paddy Lee MD   potassium chloride (KLOR-CON M) 20 MEQ extended release tablet Take 1 tablet by mouth 3 times daily (with meals)  Patient taking differently: Take 20 mEq by mouth daily  Yes Paddy Lee MD   atorvastatin (LIPITOR) 40 MG tablet Take 1 tablet by mouth daily Yes Historical Provider, MD   LORazepam (ATIVAN) 0.5 MG tablet as needed.   Yes Historical Provider, MD   aspirin 81 MG tablet Take 81 mg by mouth daily Yes Historical Provider, MD JACOBO VITAMIN D-3 2000 UNITS CAPS 1 capsule daily  Yes Historical Provider, MD OROPEZAMISTA 137-50 MCG/ACT SUSP as needed  Yes Historical Provider, MD   methylPREDNISolone (MEDROL DOSEPACK) 4 MG tablet   Historical Provider, MD     Allergies as of 08/25/2021    (No Known Allergies)     Objective:     Mental Status: alert and oriented to person place and time    Speech: clear  Language: normal with no anomia today     Laboratory/Radiology:     CBC:   Lab Results   Component Value Date    WBC 4.2 08/21/2020    RBC 5.24 08/21/2020    HGB 13.6 08/21/2020    HCT 42.9 08/21/2020    MCV 81.9 08/21/2020    MCH 26.0 08/21/2020    MCHC 31.7 08/21/2020    RDW 14.6 08/21/2020     08/21/2020    MPV 11.2 08/21/2020     BMP:    Lab Results   Component Value Date     07/28/2020    K 4.2 07/28/2020     07/28/2020    CO2 24 07/28/2020    BUN 14 07/28/2020    LABALBU 4.2 11/10/2016    LABALBU 4.0 04/27/2011    CREATININE 0.6 07/28/2020    CALCIUM 9.6 07/28/2020    GFRAA >60 07/28/2020    LABGLOM >60 07/28/2020    GLUCOSE 114 07/28/2020    GLUCOSE 80 04/27/2011     CT Head June 2018    Unremarkable     Carotid US March 2019   No significant stenosis     Labs and dx test personally reviewed at this appt     Assessment:     Atypical facial pain   Improved with Lyrica at current dosage and Doxepin     Gabapentin and baclofen was not helpful in the past    Migraine headache -- rare   Will try Ubrelvy for her infrequent migraines    Sleep deprivation   May need to consider r/o CYNDEE in future - states previous sleep studies unremarkable     clot retrieval for CVT   Doing well neurologically with minimal cognitive, word-finding issues     Plan:     Continue Lyrica 200 BID and Doxepin at 10mg daily    She will keep me posted how her cold sculpting goes next week    [facility] prescription sent to [address] [place] [address], TERENCE - CNS  8:58 AM  8/25/2021

## 2021-08-26 ENCOUNTER — TELEPHONE (OUTPATIENT)
Dept: NEUROLOGY | Age: 66
End: 2021-08-26

## 2021-08-26 NOTE — TELEPHONE ENCOUNTER
[facility] [place] Approval of Ubrelvy 100 mg - [id number], Valid 7/26/2021 - 8/26/2021  Electronically signed by Abhishek Friday on 8/26/21 at 11:11 AM EDT

## 2022-02-03 ENCOUNTER — VIRTUAL VISIT (OUTPATIENT)
Dept: ENDOCRINOLOGY | Age: 67
End: 2022-02-03
Payer: MEDICARE

## 2022-02-03 DIAGNOSIS — E55.9 VITAMIN D DEFICIENCY: ICD-10-CM

## 2022-02-03 DIAGNOSIS — E05.90 HYPERTHYROIDISM: Primary | ICD-10-CM

## 2022-02-03 PROCEDURE — 99214 OFFICE O/P EST MOD 30 MIN: CPT | Performed by: INTERNAL MEDICINE

## 2022-02-03 NOTE — PROGRESS NOTES
Tami Tomas was read the following message We want to confirm that, for purposes of billing, this is a virtual visit with your provider for which we will submit a claim for reimbursement with your insurance company. You will be responsible for any copays, coinsurance amounts or other amounts not covered by your insurance company. If you do not accept this, unfortunately we will not be able to schedule or proceed with a virtual visit with the provider. Do you accept? Helga responded Yes .

## 2022-02-03 NOTE — PROGRESS NOTES
[address] [place] [address] Department of Endocrinology Diabetes and Metabolism   [address] [facility] [address]   Phone: [phone]  Fax: [phone]    Date of Service: 2/3/2022  Primary Care Physician: Jd Garza MD  Provider: Sunni Garcia MD    Reason for the visit:  Hyperthyroidism    History of Present Illness: The history is provided by the patient. No  was used. Accuracy of the patient data is excellent. Winnie Harden is a very pleasant 61 y.o. female seen today for evaluation and management of hyperthyroidism     The patient was diagnosed with hyperthyroidism in 2015 and currently on Methimazole 5 mg daily   Lab Results   Component Value Date/Time    TSI 3.69 (H) 10/22/2020 12:32 PM    TPOABS 284.6 (H) 10/22/2020 12:32 PM    THGAB <0.9 10/22/2020 12:32 PM       Lab Results   Component Value Date/Time    TSH 4.170 07/23/2021 11:27 AM    T4FREE 1.29 07/23/2021 11:27 AM     Patient reported few episodes of palpitations, but denied swelling in the area of the thyroid gland, weight loss, change in appetite, nervousness, anxiety, irritability, tremor, sweating, heat intolerance, changes in bowel habits, muscle weakness or difficulty sleeping. PAST MEDICAL HISTORY   Past Medical History:   Diagnosis Date    GERD (gastroesophageal reflux disease)     History of colonoscopy 2006    neg    Trigeminal neuralgia        PAST SURGICAL HISTORY   Past Surgical History:   Procedure Laterality Date    APPENDECTOMY      1972    BRAIN SURGERY      CHOLECYSTECTOMY      1996    DILATION AND CURETTAGE      9/07    FINGER TRIGGER RELEASE      JOINT REPLACEMENT Left     Left hip    TUBAL LIGATION      1986       SOCIAL HISTORY   Tobacco:   reports that she quit smoking about 18 years ago. She has never used smokeless tobacco.  Alcohol:   reports no history of alcohol use. Drugs:   reports no history of drug use.     FAMILY HISTORY   Family History   Problem Relation Age of Onset    Diabetes Maternal Grandmother     Stroke Maternal Grandmother     Hypertension Maternal Grandmother     High Cholesterol Maternal Grandmother     Heart Disease Maternal Grandmother     Osteoporosis Maternal Grandmother     Heart Disease Father     Hypertension Mother     High Cholesterol Mother     Osteoporosis Mother     Sudden Death Mother        ALLERGIES AND DRUG REACTIONS   No Known Allergies    CURRENT MEDICATIONS   Current Outpatient Medications   Medication Sig Dispense Refill    Ubrogepant (UBRELVY) 100 MG TABS Take 100 mg by mouth as needed (migraine) 8 tablet 2    methIMAzole (TAPAZOLE) 5 MG tablet take 1 tablet by mouth once daily 90 tablet 3    methylPREDNISolone (MEDROL DOSEPACK) 4 MG tablet       loratadine (CLARITIN) 10 MG tablet Take 10 mg by mouth daily      doxepin (SINEQUAN) 10 MG capsule take 1 capsule by mouth EVERY NIGHT 90 capsule 3    bumetanide (BUMEX) 1 MG tablet Take 1 tablet by mouth 2 times daily 30 tablet 3    potassium chloride (KLOR-CON M) 20 MEQ extended release tablet Take 1 tablet by mouth 3 times daily (with meals) (Patient taking differently: Take 20 mEq by mouth daily ) 60 tablet 3    atorvastatin (LIPITOR) 40 MG tablet Take 1 tablet by mouth daily      LORazepam (ATIVAN) 0.5 MG tablet as needed. 1    aspirin 81 MG tablet Take 81 mg by mouth daily      RA VITAMIN D-3 2000 UNITS CAPS 1 capsule daily       DYMISTA 137-50 MCG/ACT SUSP as needed       pregabalin (LYRICA) 200 MG capsule take 1 capsule by mouth twice a day 60 capsule 5     No current facility-administered medications for this visit. Review of Systems  Constitutional: No fever, no chills, no diaphoresis, no generalized weakness. HEENT: No blurred vision, No sore throat, no ear pain, no hair loss  Neck: denied any neck swelling, difficulty swallowing,   Cadrdiopulomary: No CP, SOB or palpitation, No orthopnea or PND. No cough or wheezing.   GI: No N/V/D, no constipation, No abdominal pain, no melena or hematochezia   : Denied any dysuria, hematuria, flank pain, discharge, or incontinence. Skin: denied any rash, ulcer, Hirsute, or hyperpigmentation. MSK: denied any joint deformity, joint pain/swelling, muscle pain, or back pain. Neuro: no numbess, no tingling, no weakness,     OBJECTIVE    There were no vitals taken for this visit. BP Readings from Last 4 Encounters:   10/13/20 136/82   07/28/20 (!) 144/78   10/02/19 117/67   03/25/19 126/82     Wt Readings from Last 6 Encounters:   10/13/20 230 lb 3.2 oz (104.4 kg)   07/28/20 249 lb 12.8 oz (113.3 kg)   10/02/19 220 lb 11.2 oz (100.1 kg)   03/25/19 231 lb (104.8 kg)   09/20/18 223 lb 6.4 oz (101.3 kg)   06/06/18 211 lb (95.7 kg)     Physical examination:  General: awake alert, oriented x3  HEENT: normocephalic non traumatic, no exophthalmos   Neck: supple, thyroid tenderness,  Pulm: Clear equal air entry no added sounds  CVS: S1 + S2  Abd: soft lax, no tenderness  Skin: warm, no lesions, no rash.  No open wounds, no ulcers   Neuro: CN intact, muscle power normal  Psych: normal mood, and affect    Review of Laboratory Data:  I have reviewed the following:  Lab Results   Component Value Date/Time    WBC 4.2 (L) 08/21/2020 11:02 AM    RBC 5.24 08/21/2020 11:02 AM    HGB 13.6 08/21/2020 11:02 AM    HCT 42.9 08/21/2020 11:02 AM    MCV 81.9 08/21/2020 11:02 AM    MCH 26.0 08/21/2020 11:02 AM    MCHC 31.7 (L) 08/21/2020 11:02 AM    RDW 14.6 08/21/2020 11:02 AM     08/21/2020 11:02 AM    MPV 11.2 08/21/2020 11:02 AM      Lab Results   Component Value Date/Time     07/28/2020 04:24 AM    K 4.2 07/28/2020 04:24 AM    CO2 24 07/28/2020 04:24 AM    BUN 14 07/28/2020 04:24 AM    CREATININE 0.6 07/28/2020 04:24 AM    CALCIUM 9.6 07/28/2020 04:24 AM    LABGLOM >60 07/28/2020 04:24 AM    GFRAA >60 07/28/2020 04:24 AM      Lab Results   Component Value Date/Time    TSH 4.170 07/23/2021 11:27 AM    T4FREE 1.29 07/23/2021 11:27 AM    Z6MPESC 10.3 04/23/2021 10:50 AM    FT3 3.5 07/18/2016 12:20 PM    D3NRLCI 245.90 (H) 10/22/2020 12:32 PM    TSI 3.69 (H) 10/22/2020 12:32 PM    TPOABS 284.6 (H) 10/22/2020 12:32 PM    THGAB <0.9 10/22/2020 12:32 PM     Lab Results   Component Value Date    LABA1C 5.7 11/10/2016    GLUCOSE 114 07/28/2020    GLUCOSE 80 04/27/2011     Lab Results   Component Value Date    TRIG 81 11/10/2016    HDL 65 11/10/2016    LDLCALC 177 11/10/2016    CHOL 258 11/10/2016     No results found for: Rosaline, a 61 y.o.-old female seen in for management of following issues      Hyperthyroidism  · Currently on Methimazole to 5 mg daily   · Check TFT now and adjust the dose if needed   · Reviewed potential side effects of Methimazole    HLD  · Continue Lipitor 40 mg daily     VitD deficiency    · Discussed the effect of hyperthyroidism on bone health  · Continue vitD supplement   · Check level     I personally reviewed external notes from PCP and other patient's care team providers, and personally interpreted labs associated with the above diagnosis. I also ordered labs to further assess and manage the above addressed medical conditions    Return in about 5 months (around 7/3/2022) for Hyperthyroidism, VitD deficiency. The above issues were reviewed with the patient who understood and agreed with the plan. Thank you for allowing us to participate in the care of this patient. Please do not hesitate to contact us with any additional questions. Diagnosis Orders   1. Hyperthyroidism  TSH without Reflex    T4, Free   2. Vitamin D deficiency  Vitamin D 25 Hydroxy    Basic Metabolic Panel     Dana Blount MD  Endocrinologist, [place] [address] [facility]   [address] [facility] [address]   Phone: [phone]  Fax: [phone]  ---------------------------------  An electronic signature was used to authenticate this note.  Chano Velez MD on 2/3/2022 at 10:06 AM

## 2022-02-08 DIAGNOSIS — E05.90 HYPERTHYROIDISM: ICD-10-CM

## 2022-02-08 DIAGNOSIS — E55.9 VITAMIN D DEFICIENCY: ICD-10-CM

## 2022-02-13 ENCOUNTER — TELEPHONE (OUTPATIENT)
Dept: ENDOCRINOLOGY | Age: 67
End: 2022-02-13

## 2022-02-23 ENCOUNTER — SCHEDULED TELEPHONE ENCOUNTER (OUTPATIENT)
Dept: NEUROLOGY | Age: 67
End: 2022-02-23
Payer: MEDICARE

## 2022-02-23 DIAGNOSIS — G50.1 ATYPICAL FACIAL PAIN: Primary | ICD-10-CM

## 2022-02-23 PROCEDURE — 99442 PR PHYS/QHP TELEPHONE EVALUATION 11-20 MIN: CPT | Performed by: CLINICAL NURSE SPECIALIST

## 2022-02-23 RX ORDER — DOXEPIN HYDROCHLORIDE 10 MG/1
CAPSULE ORAL
Qty: 90 CAPSULE | Refills: 3 | Status: SHIPPED | OUTPATIENT
Start: 2022-02-23

## 2022-02-23 RX ORDER — PREGABALIN 200 MG/1
CAPSULE ORAL
Qty: 60 CAPSULE | Refills: 5 | Status: SHIPPED
Start: 2022-02-23 | End: 2022-08-18 | Stop reason: SDUPTHER

## 2022-02-23 NOTE — TELEPHONE ENCOUNTER
Isis Pollock is a 61 y.o. female evaluated via telephone on 2/23/2022. Consent:  She and/or health care decision maker is aware that that she may receive a bill for this telephone service, depending on her insurance coverage, and has provided verbal consent to proceed: Yes  I affirm this is a Patient Initiated Episode with an Established Patient who has not had a related appointment within my department in the past 7 days or scheduled within the next 24 hours. Total Time: minutes: 11-20 minutes  Patient advised regarding steps to help prevent the spread of COVID-19   SOURCE - https://heriberto-holloway.info/. html   1-Stay home except to get medical care  2-Clean your hands often for atleast 20 secnds, avoid touching: Avoid touching your eyes, nose, and mouth with unwashed hands. 3-Seek medical attention: Seek prompt medical attention if your illness is worsening (e.g., difficulty breathing).   Call you doctor first.  3-Wear a facemask if you are sick   4-Cover your coughs and sneezes      Note: not billable if this call serves to triage the patient into an appointment for the relevant concern  Date of Telephone Visit:   4/8/22  Consent:  The patient and/or health care decision maker is aware that that he may receive a bill for this telephone service, depending on his insurance coverage, and has provided verbal consent to proceed: Yes    Long history of atypical facial pain following a bout with trigeminal neuralgia   A presumed infection triggering her issues   Tried gabapentin and baclofen without improvement   Responded to Lyrica at high doses quite well and Doxepin (per Dr Ashley Silva)     She   She utilize an old prescription for Imitrex-we did discuss a safety at this time given her history of CVT I would switch her to a CGRP agent  In June 2016 she had the flu for about 3 days   Shortly afterwards, developed severe headache -- seen in ER, diagnosed with CVT   Transferred to [facility] -- underwent clot retrieval and recovered well    Felt she was severely dehydrated from influenza    Left with short-term memory deficits and now no longer working at the [employer]     denies visual issues - no double vision or loss of vision   No nausea or vomiting    Remains off AC   Was previously started on warfarin -- developed bleeding and it was discontinued by [facility]    Now on daily baby Aspirin     No chest pain or palpitations  No SOB  No vertigo, lightheadedness or loss of consciousness  No falls, tripping or stumbling  No incontinence of bowels or bladder  No itching or bruising appreciated  No numbness, tingling or focal arm/leg weakness    ROS otherwise negative     Prior to Visit Medications    Medication Sig Taking? Authorizing Provider   doxepin (SINEQUAN) 10 MG capsule take 1 capsule by mouth EVERY NIGHT Yes TERENCE Basilio   pregabalin (LYRICA) 200 MG capsule take 1 capsule by mouth twice a day Yes TERENCE Basilio   Ubrogepant (UBRELVY) 100 MG TABS Take 100 mg by mouth as needed (migraine) Yes TERENCE Basilio   methIMAzole (TAPAZOLE) 5 MG tablet take 1 tablet by mouth once daily Yes Haseeb Wagner MD   loratadine (CLARITIN) 10 MG tablet Take 10 mg by mouth daily Yes Historical Provider, MD   bumetanide (BUMEX) 1 MG tablet Take 1 tablet by mouth 2 times daily Yes Dawn Leal MD   potassium chloride (KLOR-CON M) 20 MEQ extended release tablet Take 1 tablet by mouth 3 times daily (with meals)  Patient taking differently: Take 20 mEq by mouth daily  Yes Dawn Leal MD   atorvastatin (LIPITOR) 40 MG tablet Take 1 tablet by mouth daily Yes Historical Provider, MD   LORazepam (ATIVAN) 0.5 MG tablet as needed.   Yes Historical Provider, MD   aspirin 81 MG tablet Take 81 mg by mouth daily Yes Historical Provider, MD JACOBO VITAMIN D-3 2000 UNITS CAPS 1 capsule daily  Yes Historical Provider, MD   DYMISTA 137-50 MCG/ACT SUSP as needed  Yes Historical Provider, MD   methylPREDNISolone (MEDROL DOSEPACK) 4 MG tablet   Historical Provider, MD     Allergies as of 02/23/2022    (No Known Allergies)     Objective:     Mental Status: alert and oriented to person place and time    Speech: clear  Language: normal with no anomia today     Laboratory/Radiology:     CBC:   Lab Results   Component Value Date    WBC 4.2 08/21/2020    RBC 5.24 08/21/2020    HGB 13.6 08/21/2020    HCT 42.9 08/21/2020    MCV 81.9 08/21/2020    MCH 26.0 08/21/2020    MCHC 31.7 08/21/2020    RDW 14.6 08/21/2020     08/21/2020    MPV 11.2 08/21/2020     BMP:    Lab Results   Component Value Date     05/05/2021    K 3.9 05/05/2021    K 4.2 07/28/2020     05/05/2021    CO2 24 07/28/2020    BUN 14 07/28/2020    LABALBU 4.2 11/10/2016    LABALBU 4.0 04/27/2011    CREATININE 0.83 05/05/2021    CALCIUM 9.2 05/05/2021    GFRAA >60 07/28/2020    LABGLOM >60 07/28/2020    GLUCOSE 114 07/28/2020    GLUCOSE 80 04/27/2011     CT Head June 2018    Unremarkable     Carotid US March 2019   No significant stenosis     Labs and dx test personally reviewed at this appt     Assessment:     Atypical facial pain   Improved with Lyrica at current dosage and Doxepin     Gabapentin and baclofen was not helpful in the past    Migraine headache -- rare   Will try Ubrelvy for her infrequent migraines    Sleep deprivation   May need to consider r/o CYNDEE in future - states previous sleep studies unremarkable     clot retrieval for CVT   Doing well neurologically with minimal cognitive, word-finding issues     Plan:     Continue Lyrica 200 BID and Doxepin at 10mg daily    Lidia Kussmaul, APRN - CNS  11:26 AM  2/23/2022

## 2022-07-05 ENCOUNTER — TELEMEDICINE (OUTPATIENT)
Dept: ENDOCRINOLOGY | Age: 67
End: 2022-07-05
Payer: MEDICARE

## 2022-07-05 ENCOUNTER — HOSPITAL ENCOUNTER (OUTPATIENT)
Age: 67
Discharge: HOME | End: 2022-07-05
Payer: MEDICARE

## 2022-07-05 DIAGNOSIS — E55.9 VITAMIN D DEFICIENCY: ICD-10-CM

## 2022-07-05 DIAGNOSIS — E78.2 MIXED HYPERLIPIDEMIA: ICD-10-CM

## 2022-07-05 DIAGNOSIS — E05.90 HYPERTHYROIDISM: ICD-10-CM

## 2022-07-05 DIAGNOSIS — E05.90 HYPERTHYROIDISM: Primary | ICD-10-CM

## 2022-07-05 LAB
ANION GAP SERPL CALCULATED.3IONS-SCNC: 12 MMOL/L (ref 7–16)
BUN BLDV-MCNC: 17 MG/DL (ref 6–23)
CALCIUM SERPL-MCNC: 9.3 MG/DL (ref 8.6–10.2)
CHLORIDE BLD-SCNC: 106 MMOL/L (ref 98–107)
CO2: 23 MMOL/L (ref 22–29)
CREAT SERPL-MCNC: 0.9 MG/DL (ref 0.5–1)
GFR AFRICAN AMERICAN: >60
GFR NON-AFRICAN AMERICAN: >60 ML/MIN/1.73
GLUCOSE BLD-MCNC: 80 MG/DL (ref 74–99)
POTASSIUM SERPL-SCNC: 3.9 MMOL/L (ref 3.5–5)
SODIUM BLD-SCNC: 141 MMOL/L (ref 132–146)
T4 FREE: 1.33 NG/DL (ref 0.93–1.7)
TSH SERPL DL<=0.05 MIU/L-ACNC: 3.82 UIU/ML (ref 0.27–4.2)
VITAMIN D 25-HYDROXY: 63 NG/ML (ref 30–100)

## 2022-07-05 PROCEDURE — 80048 BASIC METABOLIC PNL TOTAL CA: CPT

## 2022-07-05 PROCEDURE — 82306 VITAMIN D 25 HYDROXY: CPT

## 2022-07-05 PROCEDURE — 36415 COLL VENOUS BLD VENIPUNCTURE: CPT

## 2022-07-05 PROCEDURE — 84445 ASSAY OF TSI GLOBULIN: CPT

## 2022-07-05 PROCEDURE — 84439 ASSAY OF FREE THYROXINE: CPT

## 2022-07-05 PROCEDURE — 84443 ASSAY THYROID STIM HORMONE: CPT

## 2022-07-05 PROCEDURE — 99214 OFFICE O/P EST MOD 30 MIN: CPT | Performed by: CLINICAL NURSE SPECIALIST

## 2022-07-05 NOTE — PROGRESS NOTES
Miguel Balderas was read the following message We want to confirm that, for purposes of billing, this is a virtual visit with your provider for which we will submit a claim for reimbursement with your insurance company. You will be responsible for any copays, coinsurance amounts or other amounts not covered by your insurance company. If you do not accept this, unfortunately we will not be able to schedule or proceed with a virtual visit with the provider. Do you accept? Helga responded Yes .

## 2022-07-05 NOTE — PROGRESS NOTES
[address] [place] [address] Department of Endocrinology Diabetes and Metabolism   [address] [facility] [address]   Phone: [phone]  Fax: [phone]    Date of Service: 7/5/2022  Primary Care Physician: Allen Enamorado MD  Provider: TERENCE Park      Reason for the visit:  Hyperthyroidism    History of Present Illness: The history is provided by the patient. No  was used. Accuracy of the patient data is excellent. Tara Esparza is a very pleasant 61 y.o. female seen today for evaluation and management of hyperthyroidism     The patient was diagnosed with hyperthyroidism in 2015 and currently on Methimazole 5 mg daily   Lab Results   Component Value Date/Time    TSI 3.69 (H) 10/22/2020 12:32 PM    TPOABS 284.6 (H) 10/22/2020 12:32 PM    THGAB <0.9 10/22/2020 12:32 PM       Lab Results   Component Value Date    TSH 4.170 07/23/2021    O3ZFRVX 245.90 (H) 10/22/2020    K6LOYCD 10.3 04/23/2021    FT3 3.5 07/18/2016    T4FREE 1.29 07/23/2021       Patient reported few episodes of palpitations, but denied swelling in the area of the thyroid gland, weight loss, change in appetite, nervousness, anxiety, irritability, tremor, sweating, heat intolerance, changes in bowel habits, muscle weakness or difficulty sleeping. PAST MEDICAL HISTORY   Past Medical History:   Diagnosis Date    GERD (gastroesophageal reflux disease)     History of colonoscopy 2006    neg    Trigeminal neuralgia        PAST SURGICAL HISTORY   Past Surgical History:   Procedure Laterality Date    APPENDECTOMY      1972    BRAIN SURGERY      CHOLECYSTECTOMY      1996    DILATION AND CURETTAGE      9/07    JOINT REPLACEMENT Left     Left hip    TRIGGER FINGER RELEASE      TUBAL LIGATION      1986       SOCIAL HISTORY   Tobacco:   reports that she quit smoking about 18 years ago. She has never used smokeless tobacco.  Alcohol:   reports no history of alcohol use.   Drugs: reports no history of drug use. FAMILY HISTORY   Family History   Problem Relation Age of Onset    Diabetes Maternal Grandmother     Stroke Maternal Grandmother     Hypertension Maternal Grandmother     High Cholesterol Maternal Grandmother     Heart Disease Maternal Grandmother     Osteoporosis Maternal Grandmother     Heart Disease Father     Hypertension Mother     High Cholesterol Mother     Osteoporosis Mother     Sudden Death Mother        ALLERGIES AND DRUG REACTIONS   No Known Allergies    CURRENT MEDICATIONS   Current Outpatient Medications   Medication Sig Dispense Refill    methIMAzole (TAPAZOLE) 5 MG tablet take 1 tablet by mouth once daily 90 tablet 3    doxepin (SINEQUAN) 10 MG capsule take 1 capsule by mouth EVERY NIGHT 90 capsule 3    pregabalin (LYRICA) 200 MG capsule take 1 capsule by mouth twice a day 60 capsule 5    Ubrogepant (UBRELVY) 100 MG TABS Take 100 mg by mouth as needed (migraine) 8 tablet 2    methylPREDNISolone (MEDROL DOSEPACK) 4 MG tablet  (Patient not taking: Reported on 2/23/2022)      loratadine (CLARITIN) 10 MG tablet Take 10 mg by mouth daily      bumetanide (BUMEX) 1 MG tablet Take 1 tablet by mouth 2 times daily 30 tablet 3    potassium chloride (KLOR-CON M) 20 MEQ extended release tablet Take 1 tablet by mouth 3 times daily (with meals) (Patient taking differently: Take 20 mEq by mouth daily ) 60 tablet 3    atorvastatin (LIPITOR) 40 MG tablet Take 1 tablet by mouth daily      LORazepam (ATIVAN) 0.5 MG tablet as needed. 1    aspirin 81 MG tablet Take 81 mg by mouth daily      RA VITAMIN D-3 2000 UNITS CAPS 1 capsule daily       DYMISTA 137-50 MCG/ACT SUSP as needed        No current facility-administered medications for this visit. Review of Systems  Constitutional: No fever, no chills, no diaphoresis, no generalized weakness.   HEENT: No blurred vision, No sore throat, no ear pain, no hair loss  Neck: denied any neck swelling, difficulty swallowing,   Cadrdiopulomary: No CP, SOB or palpitation, No orthopnea or PND. No cough or wheezing. GI: No N/V/D, no constipation, No abdominal pain, no melena or hematochezia   : Denied any dysuria, hematuria, flank pain, discharge, or incontinence. Skin: denied any rash, ulcer, Hirsute, or hyperpigmentation. MSK: denied any joint deformity, joint pain/swelling, muscle pain, or back pain. Neuro: no numbess, no tingling, no weakness,     OBJECTIVE    There were no vitals taken for this visit. BP Readings from Last 4 Encounters:   10/13/20 136/82   07/28/20 (!) 144/78   10/02/19 117/67   03/25/19 126/82     Wt Readings from Last 6 Encounters:   10/13/20 230 lb 3.2 oz (104.4 kg)   07/28/20 249 lb 12.8 oz (113.3 kg)   10/02/19 220 lb 11.2 oz (100.1 kg)   03/25/19 231 lb (104.8 kg)   09/20/18 223 lb 6.4 oz (101.3 kg)   06/06/18 211 lb (95.7 kg)     Physical examination:  Due to this being a TeleHealth encounter, evaluation of the following organ systems is limited: Vitals/Constitutional/EENT/Resp/CV/GI//MS/Neuro/Skin/Heme-Lymph-Imm. Modified physical exam through Telemedicine camera    General: Communicating well via camera   Neck: no obvious neck mass. No obvious neck deformity     CVS: no distress   Chest: no distress. Chest is moving with respiration    Extremities:  no visible tremor  Skin: No visible rashes as seen from camera   Musculoskeletal: no visible deformity  Neuro: Alert and oriented to person, place, and time. Psychiatric: Normal mood and affect.  Behavior is normal      Review of Laboratory Data:  I have reviewed the following:  Lab Results   Component Value Date/Time    WBC 4.2 (L) 08/21/2020 11:02 AM    RBC 5.24 08/21/2020 11:02 AM    HGB 13.6 08/21/2020 11:02 AM    HCT 42.9 08/21/2020 11:02 AM    MCV 81.9 08/21/2020 11:02 AM    MCH 26.0 08/21/2020 11:02 AM    MCHC 31.7 (L) 08/21/2020 11:02 AM    RDW 14.6 08/21/2020 11:02 AM     08/21/2020 11:02 AM    MPV 11.2 08/21/2020 11:02 AM      Lab Results   Component Value Date/Time     05/05/2021 12:00 AM    K 3.9 05/05/2021 12:00 AM    K 4.2 07/28/2020 04:24 AM    CO2 24 07/28/2020 04:24 AM    BUN 14 07/28/2020 04:24 AM    CREATININE 0.83 05/05/2021 12:00 AM    CALCIUM 9.2 05/05/2021 12:00 AM    LABGLOM >60 07/28/2020 04:24 AM    GFRAA >60 07/28/2020 04:24 AM      Lab Results   Component Value Date/Time    TSH 4.170 07/23/2021 11:27 AM    T4FREE 1.29 07/23/2021 11:27 AM    T0DSOKE 10.3 04/23/2021 10:50 AM    FT3 3.5 07/18/2016 12:20 PM    B6IHZJM 245.90 (H) 10/22/2020 12:32 PM    TSI 3.69 (H) 10/22/2020 12:32 PM    TPOABS 284.6 (H) 10/22/2020 12:32 PM    THGAB <0.9 10/22/2020 12:32 PM     Lab Results   Component Value Date/Time    LABA1C 5.7 11/10/2016 02:10 PM    GLUCOSE 114 07/28/2020 04:24 AM    GLUCOSE 80 04/27/2011 09:55 AM     Lab Results   Component Value Date/Time    TRIG 81 11/10/2016 02:10 PM    HDL 65 11/10/2016 02:10 PM    [facility] 177 11/10/2016 02:10 PM    CHOL 258 11/10/2016 02:10 PM     No results found for: Rosaline, a 61 y.o.-old female seen in for management of following issues      Hyperthyroidism  · Currently on Methimazole to 5 mg daily   · Check TFT now and adjust the dose if needed   · Reviewed potential side effects of Methimazole eluding a granulocytosis and liver dysfunction  · If TSI is negative and thyroid function testing is normal Will attempt to wean methimazole    HLD  · Continue Lipitor 40 mg daily     VitD deficiency    · Discussed the effect of hyperthyroidism on bone health  · Continue vitD supplement   · Check vitamin D level    I personally spent > 30 minutes reviewing external notes from PCP and other patient's care team providers, and personally interpreted labs associated with the above diagnosis. I also ordered labs to further assess and manage the above addressed medical conditions    No follow-ups on file.     The above issues were reviewed with the patient who understood and agreed with the plan. Thank you for allowing us to participate in the care of this patient. Please do not hesitate to contact us with any additional questions. Diagnosis Orders   1. Hyperthyroidism  T4, Free    TSH    THYROID STIMULATING IMMUNOGLOBULIN   2. Vitamin D deficiency  Vitamin D 25 Hydroxy   3. Mixed hyperlipidemia       TERENCE Jarrett    Endocrinologist, [facility]   [address] [facility] [address]   Phone: [phone]  Fax: [phone]  ---------------------------------  An electronic signature was used to authenticate this note.  TERENCE Jarrett   on 7/5/2022 at 9:28 AM

## 2022-07-06 ENCOUNTER — TELEPHONE (OUTPATIENT)
Dept: ENDOCRINOLOGY | Age: 67
End: 2022-07-06

## 2022-07-06 DIAGNOSIS — E05.90 HYPERTHYROIDISM: ICD-10-CM

## 2022-07-06 RX ORDER — METHIMAZOLE 5 MG/1
TABLET ORAL
Qty: 45 TABLET | Refills: 3
Start: 2022-07-06 | End: 2022-08-31

## 2022-07-06 NOTE — TELEPHONE ENCOUNTER
----- Message from TERENCE Michel - CNS sent at 7/6/2022 11:03 AM EDT -----  Please call patient and inform her thyroid function is normal.  Have patient decrease methimazole to 5 mg take half a tablet daily for a total of 2.5 mg daily .   Thanks

## 2022-07-08 LAB — THYROID STIMULATING IMMUNOGLOBULIN: 0.86 IU/L

## 2022-08-18 ENCOUNTER — OFFICE VISIT (OUTPATIENT)
Dept: NEUROLOGY | Age: 67
End: 2022-08-18
Payer: MEDICARE

## 2022-08-18 VITALS
BODY MASS INDEX: 35.92 KG/M2 | TEMPERATURE: 97.2 F | WEIGHT: 237 LBS | OXYGEN SATURATION: 97 % | SYSTOLIC BLOOD PRESSURE: 113 MMHG | DIASTOLIC BLOOD PRESSURE: 71 MMHG | HEIGHT: 68 IN | HEART RATE: 77 BPM

## 2022-08-18 DIAGNOSIS — G50.1 ATYPICAL FACIAL PAIN: Primary | ICD-10-CM

## 2022-08-18 DIAGNOSIS — G08 CEREBRAL VENOUS THROMBOSIS: ICD-10-CM

## 2022-08-18 DIAGNOSIS — G43.009 MIGRAINE WITHOUT AURA AND WITHOUT STATUS MIGRAINOSUS, NOT INTRACTABLE: ICD-10-CM

## 2022-08-18 PROCEDURE — 99214 OFFICE O/P EST MOD 30 MIN: CPT | Performed by: CLINICAL NURSE SPECIALIST

## 2022-08-18 RX ORDER — MELOXICAM 15 MG/1
TABLET ORAL
COMMUNITY
Start: 2022-06-27

## 2022-08-18 RX ORDER — PREGABALIN 200 MG/1
CAPSULE ORAL
Qty: 60 CAPSULE | Refills: 5 | Status: SHIPPED | OUTPATIENT
Start: 2022-08-18 | End: 2023-02-25

## 2022-08-30 DIAGNOSIS — E05.90 HYPERTHYROIDISM: ICD-10-CM

## 2022-08-31 RX ORDER — METHIMAZOLE 5 MG/1
TABLET ORAL
Qty: 90 TABLET | Refills: 5 | Status: SHIPPED | OUTPATIENT
Start: 2022-08-31

## 2022-11-10 LAB
ALBUMIN SERPL-MCNC: 3.7 G/DL
ALP BLD-CCNC: 82 U/L
ALT SERPL-CCNC: 17 U/L
ANION GAP SERPL CALCULATED.3IONS-SCNC: NORMAL MMOL/L
AST SERPL-CCNC: 11 U/L
BILIRUB SERPL-MCNC: 0.3 MG/DL (ref 0.1–1.4)
BUN BLDV-MCNC: 15 MG/DL
CALCIUM SERPL-MCNC: 9.2 MG/DL
CHLORIDE BLD-SCNC: 108 MMOL/L
CHOLESTEROL, TOTAL: 190 MG/DL
CHOLESTEROL/HDL RATIO: 3.2
CO2: 29 MMOL/L
CREAT SERPL-MCNC: 0.82 MG/DL
GFR CALCULATED: 80
GLUCOSE BLD-MCNC: 66 MG/DL
HDLC SERPL-MCNC: 59 MG/DL (ref 35–70)
LDL CHOLESTEROL CALCULATED: 114 MG/DL (ref 0–160)
NONHDLC SERPL-MCNC: 131 MG/DL
POTASSIUM SERPL-SCNC: 4.1 MMOL/L
SODIUM BLD-SCNC: 142 MMOL/L
TOTAL PROTEIN: 6.8
TRIGL SERPL-MCNC: 76 MG/DL
TSH SERPL DL<=0.05 MIU/L-ACNC: 0.37 UIU/ML
VLDLC SERPL CALC-MCNC: NORMAL MG/DL

## 2022-12-02 ENCOUNTER — HOSPITAL ENCOUNTER (OUTPATIENT)
Age: 67
Discharge: HOME | End: 2022-12-04

## 2022-12-02 PROCEDURE — 87081 CULTURE SCREEN ONLY: CPT

## 2022-12-04 LAB — MRSA CULTURE ONLY: NORMAL

## 2022-12-09 ENCOUNTER — HOSPITAL ENCOUNTER (OUTPATIENT)
Age: 67
Discharge: HOME | End: 2022-12-11

## 2022-12-09 LAB
ANION GAP SERPL CALCULATED.3IONS-SCNC: 10 MMOL/L (ref 7–16)
BUN BLDV-MCNC: 13 MG/DL (ref 6–23)
CALCIUM SERPL-MCNC: 9 MG/DL (ref 8.6–10.2)
CHLORIDE BLD-SCNC: 103 MMOL/L (ref 98–107)
CO2: 26 MMOL/L (ref 22–29)
CREAT SERPL-MCNC: 0.8 MG/DL (ref 0.5–1)
GFR SERPL CREATININE-BSD FRML MDRD: >60 ML/MIN/1.73
GLUCOSE BLD-MCNC: 125 MG/DL (ref 74–99)
HCT VFR BLD CALC: 38.5 % (ref 34–48)
HEMOGLOBIN: 12.2 G/DL (ref 11.5–15.5)
POTASSIUM SERPL-SCNC: 3.8 MMOL/L (ref 3.5–5)
SODIUM BLD-SCNC: 139 MMOL/L (ref 132–146)

## 2022-12-09 PROCEDURE — 80048 BASIC METABOLIC PNL TOTAL CA: CPT

## 2022-12-09 PROCEDURE — 85018 HEMOGLOBIN: CPT

## 2022-12-09 PROCEDURE — 85014 HEMATOCRIT: CPT

## 2023-01-03 ENCOUNTER — TELEPHONE (OUTPATIENT)
Dept: ENDOCRINOLOGY | Age: 68
End: 2023-01-03

## 2023-01-03 NOTE — TELEPHONE ENCOUNTER
Patient called stated she needed to change her office visit to a VV she had shoulder surgery and cant drive

## 2023-01-05 ENCOUNTER — TELEMEDICINE (OUTPATIENT)
Dept: ENDOCRINOLOGY | Age: 68
End: 2023-01-05
Payer: MEDICARE

## 2023-01-05 DIAGNOSIS — E05.90 HYPERTHYROIDISM: Primary | ICD-10-CM

## 2023-01-05 DIAGNOSIS — E55.9 VITAMIN D DEFICIENCY: ICD-10-CM

## 2023-01-05 DIAGNOSIS — E78.2 MIXED HYPERLIPIDEMIA: ICD-10-CM

## 2023-01-05 PROCEDURE — 99214 OFFICE O/P EST MOD 30 MIN: CPT | Performed by: CLINICAL NURSE SPECIALIST

## 2023-01-05 PROCEDURE — 1123F ACP DISCUSS/DSCN MKR DOCD: CPT | Performed by: CLINICAL NURSE SPECIALIST

## 2023-01-05 NOTE — PROGRESS NOTES
[address] [place] [address] Department of Endocrinology Diabetes and Metabolism   [address] [facility], [address] [place], [place] [address]   Phone: [phone]  Fax: [phone]    Date of Service: 1/5/2023  Primary Care Physician: Fern Velazquez MD  Provider: TERENCE Gage      Reason for the visit:  Hyperthyroidism    History of Present Illness: The history is provided by the patient. No  was used. Accuracy of the patient data is excellent. Ar Dumont is a very pleasant 74 y.o. female seen today for evaluation and management of hyperthyroidism     The patient was diagnosed with hyperthyroidism in 2015 and currently on Methimazole 2.5 mg daily     Last TSH 0.37 slightly low per assay 12/22 done at [facility]  No T4 or T3 were drawn  Lab Results   Component Value Date/Time    TSI 3.69 (H) 10/22/2020 12:32 PM    TPOABS 284.6 (H) 10/22/2020 12:32 PM    THGAB <0.9 10/22/2020 12:32 PM       Lab Results   Component Value Date    TSH 3.820 07/05/2022    E5XQXNA 245.90 (H) 10/22/2020    I6EYKVT 10.3 04/23/2021    FT3 3.5 07/18/2016    T4FREE 1.33 07/05/2022       Patient reported few episodes of palpitations, but denied swelling in the area of the thyroid gland, weight loss, change in appetite, nervousness, anxiety, irritability, tremor, sweating, heat intolerance, changes in bowel habits, muscle weakness or difficulty sleeping. PAST MEDICAL HISTORY   Past Medical History:   Diagnosis Date    GERD (gastroesophageal reflux disease)     History of colonoscopy 2006    neg    Trigeminal neuralgia        PAST SURGICAL HISTORY   Past Surgical History:   Procedure Laterality Date    APPENDECTOMY      1972    BRAIN SURGERY      CHOLECYSTECTOMY      1996    DILATION AND CURETTAGE      9/07    JOINT REPLACEMENT Left     Left hip    TRIGGER FINGER RELEASE      TUBAL LIGATION      1986       SOCIAL HISTORY   Tobacco:   reports that she quit smoking about 19 years ago.  She has never used smokeless tobacco.  Alcohol:   reports no history of alcohol use. Drugs:   reports no history of drug use. FAMILY HISTORY   Family History   Problem Relation Age of Onset    Diabetes Maternal Grandmother     Stroke Maternal Grandmother     Hypertension Maternal Grandmother     High Cholesterol Maternal Grandmother     Heart Disease Maternal Grandmother     Osteoporosis Maternal Grandmother     Heart Disease Father     Hypertension Mother     High Cholesterol Mother     Osteoporosis Mother     Sudden Death Mother        ALLERGIES AND DRUG REACTIONS   No Known Allergies    CURRENT MEDICATIONS   Current Outpatient Medications   Medication Sig Dispense Refill    methIMAzole (TAPAZOLE) 5 MG tablet take 1 tablet by mouth once daily (Patient taking differently: take 2.5 tablet by mouth once daily) 90 tablet 5    meloxicam (MOBIC) 15 MG tablet take 1 tablet by mouth once daily      pregabalin (LYRICA) 200 MG capsule take 1 capsule by mouth twice a day 60 capsule 5    doxepin (SINEQUAN) 10 MG capsule take 1 capsule by mouth EVERY NIGHT 90 capsule 3    Ubrogepant (UBRELVY) 100 MG TABS Take 100 mg by mouth as needed (migraine) 8 tablet 2    methylPREDNISolone (MEDROL DOSEPACK) 4 MG tablet  (Patient not taking: No sig reported)      loratadine (CLARITIN) 10 MG tablet Take 10 mg by mouth daily      bumetanide (BUMEX) 1 MG tablet Take 1 tablet by mouth 2 times daily 30 tablet 3    potassium chloride (KLOR-CON M) 20 MEQ extended release tablet Take 1 tablet by mouth 3 times daily (with meals) (Patient taking differently: Take 20 mEq by mouth daily) 60 tablet 3    atorvastatin (LIPITOR) 40 MG tablet Take 1 tablet by mouth daily      LORazepam (ATIVAN) 0.5 MG tablet as needed. 1    aspirin 81 MG tablet Take 81 mg by mouth daily      RA VITAMIN D-3 2000 UNITS CAPS 1 capsule daily       DYMISTA 137-50 MCG/ACT SUSP as needed        No current facility-administered medications for this visit.        Review of Systems  Constitutional: No fever, no chills, no diaphoresis, no generalized weakness. HEENT: No blurred vision, No sore throat, no ear pain, no hair loss  Neck: denied any neck swelling, difficulty swallowing,   Cadrdiopulomary: No CP, SOB or palpitation, No orthopnea or PND. No cough or wheezing. GI: No N/V/D, no constipation, No abdominal pain, no melena or hematochezia   : Denied any dysuria, hematuria, flank pain, discharge, or incontinence. Skin: denied any rash, ulcer, Hirsute, or hyperpigmentation. MSK: denied any joint deformity, joint pain/swelling, muscle pain, or back pain. Neuro: no numbess, no tingling, no weakness,     OBJECTIVE    There were no vitals taken for this visit. BP Readings from Last 4 Encounters:   08/18/22 113/71   10/13/20 136/82   07/28/20 (!) 144/78   10/02/19 117/67     Wt Readings from Last 6 Encounters:   08/18/22 237 lb (107.5 kg)   10/13/20 230 lb 3.2 oz (104.4 kg)   07/28/20 249 lb 12.8 oz (113.3 kg)   10/02/19 220 lb 11.2 oz (100.1 kg)   03/25/19 231 lb (104.8 kg)   09/20/18 223 lb 6.4 oz (101.3 kg)     Physical examination:  Due to this being a TeleHealth encounter, evaluation of the following organ systems is limited: Vitals/Constitutional/EENT/Resp/CV/GI//MS/Neuro/Skin/Heme-Lymph-Imm. Modified physical exam through Telemedicine camera    General: Communicating well via camera   Neck: no obvious neck mass. No obvious neck deformity     CVS: no distress   Chest: no distress. Chest is moving with respiration    Extremities:  no visible tremor  Skin: No visible rashes as seen from camera   Musculoskeletal: no visible deformity  Neuro: Alert and oriented to person, place, and time. Psychiatric: Normal mood and affect.  Behavior is normal      Review of Laboratory Data:  I have reviewed the following:  Lab Results   Component Value Date/Time    WBC 4.2 (L) 08/21/2020 11:02 AM    RBC 5.24 08/21/2020 11:02 AM    HGB 12.2 12/09/2022 05:00 AM    HCT 38.5 12/09/2022 05:00 AM    MCV 81.9 08/21/2020 11:02 AM    MCH 26.0 08/21/2020 11:02 AM    MCHC 31.7 (L) 08/21/2020 11:02 AM    RDW 14.6 08/21/2020 11:02 AM     08/21/2020 11:02 AM    MPV 11.2 08/21/2020 11:02 AM      Lab Results   Component Value Date/Time     12/09/2022 05:00 AM    K 3.8 12/09/2022 05:00 AM    K 4.2 07/28/2020 04:24 AM    CO2 26 12/09/2022 05:00 AM    BUN 13 12/09/2022 05:00 AM    CREATININE 0.8 12/09/2022 05:00 AM    CALCIUM 9.0 12/09/2022 05:00 AM    LABGLOM >60 12/09/2022 05:00 AM    GFRAA >60 07/05/2022 11:20 AM      Lab Results   Component Value Date/Time    TSH 3.820 07/05/2022 11:20 AM    T4FREE 1.33 07/05/2022 11:20 AM    V0JWVIB 10.3 04/23/2021 10:50 AM    FT3 3.5 07/18/2016 12:20 PM    K2OWWAE 245.90 (H) 10/22/2020 12:32 PM    TSI 0.86 (H) 07/05/2022 11:20 AM    TPOABS 284.6 (H) 10/22/2020 12:32 PM    THGAB <0.9 10/22/2020 12:32 PM     Lab Results   Component Value Date/Time    LABA1C 5.7 11/10/2016 02:10 PM    GLUCOSE 125 12/09/2022 05:00 AM    GLUCOSE 80 04/27/2011 09:55 AM     Lab Results   Component Value Date/Time    TRIG 81 11/10/2016 02:10 PM    HDL 65 11/10/2016 02:10 PM    LDLCALC 177 11/10/2016 02:10 PM    CHOL 258 11/10/2016 02:10 PM     Lab Results   Component Value Date/Time    VITD25 63 07/05/2022 11:20 AM       ASSESSMENT & RECOMMENDATIONS   Queenie Quispe, a 74 y.o.-old female seen in for management of following issues      Hyperthyroidism  Increase methimazole to 5 mg 1 tablet daily  Recheck TFTs in 6 weeks  Reviewed potential side effects of Methimazole including agranulocytosis and liver dysfunction  Counseled on symptoms of hyperthyroidism.   Patient verbalized understanding    HLD  Continue Lipitor 40 mg daily     VitD deficiency    Discussed the effect of hyperthyroidism on bone health  Continue vitD supplement     I personally spent > 30 minutes reviewing external notes from PCP and other patient's care team providers, and personally interpreted labs associated with the above diagnosis. I also ordered labs to further assess and manage the above addressed medical conditions    Return in about 6 months (around 7/5/2023). The above issues were reviewed with the patient who understood and agreed with the plan. Thank you for allowing us to participate in the care of this patient. Please do not hesitate to contact us with any additional questions. Diagnosis Orders   1. Hyperthyroidism  T3, Free    T4, Free    TSH      2. Mixed hyperlipidemia        3. Vitamin D deficiency            Gregory Fermo, APRN - CNS    Endocrinologist, [facility] End[facility]   [address] [place] [id number]   Phone: [phone]  Fax: [phone]  ---------------------------------  An electronic signature was used to authenticate this note.  TERENCE Wellington   on 1/5/2023 at 10:27 AM

## 2023-01-05 NOTE — PROGRESS NOTES
Tara Esparza was read the following message We want to confirm that, for purposes of billing, this is a virtual visit with your provider for which we will submit a claim for reimbursement with your insurance company. You will be responsible for any copays, coinsurance amounts or other amounts not covered by your insurance company. If you do not accept this, unfortunately we will not be able to schedule or proceed with a virtual visit with the provider. Do you accept? Helga responded Yes .

## 2023-02-02 NOTE — PROGRESS NOTES
Celsa Angela is a 61 y.o. female     Long history of atypical facial pain following a bout with trigeminal neuralgia   A presumed infection triggering her issues   Tried gabapentin and baclofen without improvement   Responded to Lyrica at high doses quite well and Doxepin (per Dr Bella Yung)     She has migraines and no longer able to utilize triptan we tried her on [employer] last appointment and she notes marked improvement in her headaches    In June 2016 she had the flu for about 3 days   Shortly afterwards, developed severe headache -- seen in ER, diagnosed with CVT   Transferred to [facility] -- underwent clot retrieval and recovered well    Felt she was severely dehydrated from influenza    Left with short-term memory deficits and now no longer working at the [employer]     denies visual issues - no double vision or loss of vision   No nausea or vomiting    Remains off AC   Was previously started on warfarin -- developed bleeding and it was discontinued by [facility]    Now on daily baby Aspirin     No chest pain or palpitations  No SOB  No vertigo, lightheadedness or loss of consciousness  No falls, tripping or stumbling  No incontinence of bowels or bladder  No itching or bruising appreciated  No numbness, tingling or focal arm/leg weakness    ROS otherwise negative     Objective:     /71   Pulse 77   Temp 97.2 °F (36.2 °C) (Temporal)   Ht 5' 8\" (1.727 m)   Wt 237 lb (107.5 kg)   SpO2 97%   BMI 36.04 kg/m²   Afebrile      General appearance: alert, appears stated age and cooperative  Head: Normocephalic, without obvious abnormality, atraumatic  Extremities: dep edema 1+ in ankles - no cyanosis or clubbing   Pulses: 2+ and symmetric  Skin: no rashes or lesions     Mental Status: alert and oriented to person place and time    Speech: clear  Language: normal with no anomia today     Cranial Nerves:  I: smell    II: visual acuity     II: visual fields Full   II: pupils NAVEEN   III,VII: ptosis    III,IV,VI: extraocular muscles  Full ROM   V: mastication Normal   V: facial light touch sensation  Normal   V,VII: corneal reflex  Present   VII: facial muscle function - upper     VII: facial muscle function - lower Normal   VIII: hearing Normal   IX: soft palate elevation  Normal   IX,X: gag reflex    XI: trapezius strength  5/5   XI: sternocleidomastoid strength 5/5   XI: neck extension strength  5/5   XII: tongue strength  Normal     Motor:  5/5 throughout   Normal bulk and tone    Sensory:  LT and PP normal  Vibration minimally decreased in ankles     Coordination:   FN, FFM and AMELIA normal    Gait:  Walks well     DTR:   1+ throughout    Laboratory/Radiology:     CBC:   Lab Results   Component Value Date/Time    WBC 4.2 08/21/2020 11:02 AM    RBC 5.24 08/21/2020 11:02 AM    HGB 13.6 08/21/2020 11:02 AM    HCT 42.9 08/21/2020 11:02 AM    MCV 81.9 08/21/2020 11:02 AM    MCH 26.0 08/21/2020 11:02 AM    MCHC 31.7 08/21/2020 11:02 AM    RDW 14.6 08/21/2020 11:02 AM     08/21/2020 11:02 AM    MPV 11.2 08/21/2020 11:02 AM     BMP:    Lab Results   Component Value Date/Time     07/05/2022 11:20 AM    K 3.9 07/05/2022 11:20 AM    K 4.2 07/28/2020 04:24 AM     07/05/2022 11:20 AM    CO2 23 07/05/2022 11:20 AM    BUN 17 07/05/2022 11:20 AM    LABALBU 4.2 11/10/2016 02:10 PM    LABALBU 4.0 04/27/2011 09:55 AM    CREATININE 0.9 07/05/2022 11:20 AM    CALCIUM 9.3 07/05/2022 11:20 AM    GFRAA >60 07/05/2022 11:20 AM    LABGLOM >60 07/05/2022 11:20 AM    GLUCOSE 80 07/05/2022 11:20 AM    GLUCOSE 80 04/27/2011 09:55 AM     CT Head June 2018    Unremarkable     Carotid US March 2019   No significant stenosis     Labs and dx test personally reviewed at this appt     Assessment:     Atypical facial pain   Improved with Lyrica at current dosage and Doxepin    Gabapentin and baclofen was not helpful in the past    Migraine headache -- rare   Pearce resolved her migraine    Sleep deprivation   May need to consider r/o CYNDEE in future - states previous sleep studies unremarkable     clot retrieval for CVT   Doing well neurologically with minimal cognitive, word-finding issues     Plan:     Continue Lyrica 200 BID and Doxepin at 10mg daily    Ubrelvy samples once again provided    TERENCE Do - CNS  9:59 AM  8/18/2022 no

## 2023-02-10 ENCOUNTER — HOSPITAL ENCOUNTER (OUTPATIENT)
Age: 68
Discharge: HOME | End: 2023-02-10
Payer: MEDICARE

## 2023-02-10 DIAGNOSIS — G50.1 ATYPICAL FACIAL PAIN: ICD-10-CM

## 2023-02-10 DIAGNOSIS — E05.90 HYPERTHYROIDISM: ICD-10-CM

## 2023-02-10 LAB
T3 FREE: 2.9 PG/ML (ref 2–4.4)
T4 FREE: 1.21 NG/DL (ref 0.93–1.7)
TSH SERPL DL<=0.05 MIU/L-ACNC: 1.26 UIU/ML (ref 0.27–4.2)

## 2023-02-10 PROCEDURE — 84481 FREE ASSAY (FT-3): CPT

## 2023-02-10 PROCEDURE — 84439 ASSAY OF FREE THYROXINE: CPT

## 2023-02-10 PROCEDURE — 36415 COLL VENOUS BLD VENIPUNCTURE: CPT

## 2023-02-10 PROCEDURE — 84443 ASSAY THYROID STIM HORMONE: CPT

## 2023-02-11 RX ORDER — PREGABALIN 200 MG/1
CAPSULE ORAL
Qty: 60 CAPSULE | Refills: 5 | Status: SHIPPED | OUTPATIENT
Start: 2023-02-11 | End: 2023-08-20

## 2023-02-11 RX ORDER — DOXEPIN HYDROCHLORIDE 10 MG/1
CAPSULE ORAL
Qty: 90 CAPSULE | Refills: 3 | Status: SHIPPED | OUTPATIENT
Start: 2023-02-11

## 2023-02-13 ENCOUNTER — TELEPHONE (OUTPATIENT)
Dept: ENDOCRINOLOGY | Age: 68
End: 2023-02-13

## 2023-02-13 NOTE — TELEPHONE ENCOUNTER
----- Message from TERENCE Soler - CNS sent at 2/10/2023  4:42 PM EST -----  Please call pt and inform her that thyroid function is normal. Continue same

## 2023-02-21 ENCOUNTER — SCHEDULED TELEPHONE ENCOUNTER (OUTPATIENT)
Dept: NEUROLOGY | Age: 68
End: 2023-02-21
Payer: MEDICARE

## 2023-02-21 DIAGNOSIS — G50.1 ATYPICAL FACIAL PAIN: Primary | ICD-10-CM

## 2023-02-21 DIAGNOSIS — G08 CEREBRAL VENOUS THROMBOSIS: ICD-10-CM

## 2023-02-21 DIAGNOSIS — G43.009 MIGRAINE WITHOUT AURA AND WITHOUT STATUS MIGRAINOSUS, NOT INTRACTABLE: ICD-10-CM

## 2023-02-21 PROCEDURE — 99442 PR PHYS/QHP TELEPHONE EVALUATION 11-20 MIN: CPT | Performed by: CLINICAL NURSE SPECIALIST

## 2023-02-21 RX ORDER — UBROGEPANT 100 MG/1
100 TABLET ORAL PRN
Qty: 16 TABLET | Refills: 2 | Status: SHIPPED | OUTPATIENT
Start: 2023-02-21

## 2023-02-21 NOTE — PROGRESS NOTES
Maria Antonia Gonsalves was read the following message We want to confirm that, for purposes of billing, this is a virtual visit with your provider for which we will submit a claim for reimbursement with your insurance company. You will be responsible for any copays, coinsurance amounts or other amounts not covered by your insurance company. If you do not accept this, unfortunately we will not be able to schedule or proceed with a virtual visit with the provider. Do you accept? Helga responded Yes .

## 2023-02-21 NOTE — PROGRESS NOTES
Orlin Staley is a 74 y.o. female     evaluated via telephone on 2/21/2023. Consent:  She and/or health care decision maker is aware that that she may receive a bill for this telephone service, depending on her insurance coverage, and has provided verbal consent to proceed: Yes  I affirm this is a Patient Initiated Episode with an Established Patient who has not had a related appointment within my department in the past 7 days or scheduled within the next 24 hours. The patient's identity was verified at the start of the call. Others involved in call: just pt   Total Time: minutes: 11-20 minutes  Consent:  The patient and/or health care decision maker is aware that that he may receive a bill for this telephone service, depending on his insurance coverage, and has provided verbal consent to proceed: Yes  Patient advised regarding steps to help prevent the spread of COVID-19   SOURCE - https://heribertoUpToamie.info/. html   1-Stay home except to get medical care  2-Clean your hands often for atleast 20 secnds, avoid touching: Avoid touching your eyes, nose, and mouth with unwashed hands. 3-Seek medical attention: Seek prompt medical attention if your illness is worsening (e.g., difficulty breathing).   Call you doctor first.  3-Wear a facemask if you are sick   4-Cover your coughs and sneezes   Note: not billable if this call serves to triage the patient into an appointment for the relevant concern     Long history of atypical facial pain following a bout with trigeminal neuralgia   A presumed infection triggering her issues   Tried gabapentin and baclofen without improvement   Responded to Lyrica at high doses quite well and Doxepin (per Dr Tami Venegas)     She has migraines and no longer able to utilize triptan we tried her on Linda Leap and she notes marked improvement in her headaches    In June 2016 she had the flu for about 3 days   Shortly afterwards, developed severe headache -- seen in ER, diagnosed with CVT   Transferred to [facility] -- underwent clot retrieval and recovered well    Felt she was severely dehydrated from influenza    Left with short-term memory deficits and now no longer working at the [employer]     denies visual issues - no double vision or loss of vision   No nausea or vomiting    Remains off AC   Was previously started on warfarin -- developed bleeding and it was discontinued by [facility]    Now on daily baby Aspirin     No chest pain or palpitations  No SOB  No vertigo, lightheadedness or loss of consciousness  No falls, tripping or stumbling  No incontinence of bowels or bladder  No itching or bruising appreciated  No numbness, tingling or focal arm/leg weakness    ROS otherwise negative     Objective:     Mental Status: Alert, oriented to person place and year     Speech: clear without vocal quivering   Language: appropriate     Breathing seems unlabored      Laboratory/Radiology:     CBC:   Lab Results   Component Value Date/Time    WBC 4.2 08/21/2020 11:02 AM    RBC 5.24 08/21/2020 11:02 AM    HGB 12.2 12/09/2022 05:00 AM    HCT 38.5 12/09/2022 05:00 AM    MCV 81.9 08/21/2020 11:02 AM    MCH 26.0 08/21/2020 11:02 AM    MCHC 31.7 08/21/2020 11:02 AM    RDW 14.6 08/21/2020 11:02 AM     08/21/2020 11:02 AM    MPV 11.2 08/21/2020 11:02 AM     BMP:    Lab Results   Component Value Date/Time     12/09/2022 05:00 AM    K 3.8 12/09/2022 05:00 AM    K 4.2 07/28/2020 04:24 AM     12/09/2022 05:00 AM    CO2 26 12/09/2022 05:00 AM    BUN 13 12/09/2022 05:00 AM    LABALBU 3.7 11/10/2022 12:00 AM    LABALBU 4.0 04/27/2011 09:55 AM    CREATININE 0.8 12/09/2022 05:00 AM    CALCIUM 9.0 12/09/2022 05:00 AM    GFRAA >60 07/05/2022 11:20 AM    LABGLOM >60 12/09/2022 05:00 AM    GLUCOSE 125 12/09/2022 05:00 AM    GLUCOSE 80 04/27/2011 09:55 AM     CT Head June 2018    Unremarkable     Carotid US March 2019   No significant stenosis     Labs and dx test personally reviewed at this appt     Assessment:     Atypical facial pain   Improved with Lyrica at current dosage and Doxepin    Gabapentin and baclofen was not helpful in the past    Migraine headache -- rare   Ubrelvy resolved her migraine    Sleep deprivation   May need to consider r/o CYNDEE in future - states previous sleep studies unremarkable     clot retrieval for CVT   Doing well neurologically with minimal cognitive, word-finding issues     Plan:     Continue Lyrica 200 BID and Doxepin at 10mg daily    Continue Ubrelvy as well     Pietro Jacobs is a 74 y.o. female evaluated via telephone on 2/21/2023 for       Documentation:  I communicated with the patient and/or health care decision maker about the above. Details of this discussion including any medical advice provided: the above    Total Time: minutes: 11-20 minutes    Pietro Jacobs was evaluated through a synchronous (real-time) audio encounter. Patient identification was verified at the start of the visit. She (or guardian if applicable) is aware that this is a billable service, which includes applicable co-pays. This visit was conducted with the patient's (and/or legal guardian's) verbal consent. She has not had a related appointment within my department in the past 7 days or scheduled within the next 24 hours. The patient was located at Home: [address]. The provider was located at [place] [address]): [facility] [address]     Note: not billable if this call serves to triage the patient into an appointment for the relevant concern    TERENCE Ibanez  12:03 PM  2/21/2023

## 2023-03-14 NOTE — PROGRESS NOTES
[address] [place] [address] Department of Endocrinology Diabetes and Metabolism   [address] [facility] [address]   Phone: [phone]  Fax: [phone]    Date of Service: 8/3/2021  Primary Care Physician: Naila Arreaga MD  Provider: Steffanie James MD    Reason for the visit:  Hyperthyroidism    History of Present Illness: The history is provided by the patient. No  was used. Accuracy of the patient data is excellent. Tamar Chang is a very pleasant 63 y.o. female seen today for evaluation and management of hyperthyroidism     The patient was diagnosed with hyperthyroidism in 2015 and currently on Methimazole 5 mg daily   Lab Results   Component Value Date/Time    TSH 4.170 07/23/2021 11:27 AM    T4FREE 1.29 07/23/2021 11:27 AM     Patient reported few episodes of palpitations, but denied swelling in the area of the thyroid gland, weight loss, change in appetite, nervousness, anxiety, irritability, tremor, sweating, heat intolerance, changes in bowel habits, muscle weakness or difficulty sleeping. PAST MEDICAL HISTORY   Past Medical History:   Diagnosis Date    GERD (gastroesophageal reflux disease)     History of colonoscopy 2006    neg    Trigeminal neuralgia        PAST SURGICAL HISTORY   Past Surgical History:   Procedure Laterality Date    APPENDECTOMY      1972    BRAIN SURGERY      CHOLECYSTECTOMY      1996    DILATION AND CURETTAGE      9/07    FINGER TRIGGER RELEASE      JOINT REPLACEMENT Left     Left hip    TUBAL LIGATION      1986       SOCIAL HISTORY   Tobacco:   reports that she quit smoking about 17 years ago. She has never used smokeless tobacco.  Alcohol:   reports no history of alcohol use. Drugs:   reports no history of drug use.     FAMILY HISTORY   Family History   Problem Relation Age of Onset    Diabetes Maternal Grandmother     Stroke Maternal Grandmother     Hypertension Maternal Grandmother     High Cholesterol Maternal Grandmother     Heart Disease Maternal Grandmother     Osteoporosis Maternal Grandmother     Heart Disease Father     Hypertension Mother     High Cholesterol Mother     Osteoporosis Mother     Sudden Death Mother        ALLERGIES AND DRUG REACTIONS   No Known Allergies    CURRENT MEDICATIONS   Current Outpatient Medications   Medication Sig Dispense Refill    methylPREDNISolone (MEDROL DOSEPACK) 4 MG tablet       methIMAzole (TAPAZOLE) 5 MG tablet Take 1 tablet daily 30 tablet 5    loratadine (CLARITIN) 10 MG tablet Take 10 mg by mouth daily      pregabalin (LYRICA) 200 MG capsule TAKE 1 CAPSULE BY MOUTH TWICE DAILY 60 capsule 5    doxepin (SINEQUAN) 10 MG capsule take 1 capsule by mouth EVERY NIGHT 90 capsule 3    bumetanide (BUMEX) 1 MG tablet Take 1 tablet by mouth 2 times daily 30 tablet 3    potassium chloride (KLOR-CON M) 20 MEQ extended release tablet Take 1 tablet by mouth 3 times daily (with meals) (Patient taking differently: Take 20 mEq by mouth daily ) 60 tablet 3    atorvastatin (LIPITOR) 40 MG tablet Take 1 tablet by mouth daily      LORazepam (ATIVAN) 0.5 MG tablet as needed. 1    aspirin 81 MG tablet Take 81 mg by mouth daily      RA VITAMIN D-3 2000 UNITS CAPS 1 capsule daily       DYMISTA 137-50 MCG/ACT SUSP as needed       SUMAtriptan (IMITREX) 100 MG tablet Take 1 tablet by mouth once as needed for Migraine 6 tablet 2     No current facility-administered medications for this visit. Review of Systems  Constitutional: No fever, no chills, no diaphoresis, no generalized weakness. HEENT: No blurred vision, No sore throat, no ear pain, no hair loss  Neck: denied any neck swelling, difficulty swallowing,   Cadrdiopulomary: No CP, SOB or palpitation, No orthopnea or PND. No cough or wheezing. GI: No N/V/D, no constipation, No abdominal pain, no melena or hematochezia   : Denied any dysuria, hematuria, flank pain, discharge, or incontinence.    Skin: denied any rash, ulcer, Hirsute, or hyperpigmentation. MSK: denied any joint deformity, joint pain/swelling, muscle pain, or back pain. Neuro: no numbess, no tingling, no weakness,     OBJECTIVE    There were no vitals taken for this visit. BP Readings from Last 4 Encounters:   10/13/20 136/82   07/28/20 (!) 144/78   10/02/19 117/67   03/25/19 126/82     Wt Readings from Last 6 Encounters:   10/13/20 230 lb 3.2 oz (104.4 kg)   07/28/20 249 lb 12.8 oz (113.3 kg)   10/02/19 220 lb 11.2 oz (100.1 kg)   03/25/19 231 lb (104.8 kg)   09/20/18 223 lb 6.4 oz (101.3 kg)   06/06/18 211 lb (95.7 kg)     Physical examination:  General: awake alert, oriented x3  HEENT: normocephalic non traumatic, no exophthalmos   Neck: supple, thyroid tenderness,  Pulm: Clear equal air entry no added sounds  CVS: S1 + S2  Abd: soft lax, no tenderness  Skin: warm, no lesions, no rash.  No open wounds, no ulcers   Neuro: CN intact, muscle power normal  Psych: normal mood, and affect    Review of Laboratory Data:  I have reviewed the following:  Lab Results   Component Value Date/Time    WBC 4.2 (L) 08/21/2020 11:02 AM    RBC 5.24 08/21/2020 11:02 AM    HGB 13.6 08/21/2020 11:02 AM    HCT 42.9 08/21/2020 11:02 AM    MCV 81.9 08/21/2020 11:02 AM    MCH 26.0 08/21/2020 11:02 AM    MCHC 31.7 (L) 08/21/2020 11:02 AM    RDW 14.6 08/21/2020 11:02 AM     08/21/2020 11:02 AM    MPV 11.2 08/21/2020 11:02 AM      Lab Results   Component Value Date/Time     07/28/2020 04:24 AM    K 4.2 07/28/2020 04:24 AM    CO2 24 07/28/2020 04:24 AM    BUN 14 07/28/2020 04:24 AM    CREATININE 0.6 07/28/2020 04:24 AM    CALCIUM 9.6 07/28/2020 04:24 AM    LABGLOM >60 07/28/2020 04:24 AM    GFRAA >60 07/28/2020 04:24 AM      Lab Results   Component Value Date/Time    TSH 4.170 07/23/2021 11:27 AM    T4FREE 1.29 07/23/2021 11:27 AM    V5UXCYN 10.3 04/23/2021 10:50 AM    FT3 3.5 07/18/2016 12:20 PM    J3AHNTE 245.90 (H) 10/22/2020 12:32 PM    TSI 3.69 (H) 10/22/2020 12:32 PM Yes

## 2023-07-05 ENCOUNTER — OFFICE VISIT (OUTPATIENT)
Dept: ENDOCRINOLOGY | Age: 68
End: 2023-07-05
Payer: MEDICARE

## 2023-07-05 VITALS
WEIGHT: 236 LBS | BODY MASS INDEX: 35.77 KG/M2 | SYSTOLIC BLOOD PRESSURE: 125 MMHG | DIASTOLIC BLOOD PRESSURE: 92 MMHG | OXYGEN SATURATION: 100 % | HEIGHT: 68 IN | HEART RATE: 68 BPM

## 2023-07-05 DIAGNOSIS — E55.9 VITAMIN D DEFICIENCY: ICD-10-CM

## 2023-07-05 DIAGNOSIS — E78.2 MIXED HYPERLIPIDEMIA: ICD-10-CM

## 2023-07-05 DIAGNOSIS — E05.90 HYPERTHYROIDISM: ICD-10-CM

## 2023-07-05 DIAGNOSIS — E05.90 HYPERTHYROIDISM: Primary | ICD-10-CM

## 2023-07-05 LAB
CHOLESTEROL, TOTAL: 179 MG/DL (ref 0–199)
HDLC SERPL-MCNC: 52 MG/DL
LDLC SERPL CALC-MCNC: 114 MG/DL (ref 0–99)
T3FREE SERPL-MCNC: 3.2 PG/ML (ref 2–4.4)
T4 FREE SERPL-MCNC: 1.79 NG/DL (ref 0.93–1.7)
TRIGL SERPL-MCNC: 65 MG/DL (ref 0–149)
TSH SERPL-MCNC: 2.06 UIU/ML (ref 0.27–4.2)
VITAMIN D 25-HYDROXY: 34 NG/ML (ref 30–100)
VLDLC SERPL CALC-MCNC: 13 MG/DL

## 2023-07-05 PROCEDURE — 1123F ACP DISCUSS/DSCN MKR DOCD: CPT | Performed by: CLINICAL NURSE SPECIALIST

## 2023-07-05 PROCEDURE — 99214 OFFICE O/P EST MOD 30 MIN: CPT | Performed by: CLINICAL NURSE SPECIALIST

## 2023-07-05 NOTE — PROGRESS NOTES
external notes from PCP and other patient's care team providers, and personally interpreted labs associated with the above diagnosis. I also ordered labs to further assess and manage the above addressed medical conditions    Return in about 3 months (around 10/5/2023). The above issues were reviewed with the patient who understood and agreed with the plan. Thank you for allowing us to participate in the care of this patient. Please do not hesitate to contact us with any additional questions. Diagnosis Orders   1. Hyperthyroidism  T4, Free    T3, Free    TSH      2. Mixed hyperlipidemia  LIPID PANEL      3. Vitamin D deficiency  Vitamin D 25 Hydroxy            TERENCE Kasper    [address] [facility] Diabetes Care and Endocrinology   [address] [place] [address]   Phone: [phone]  Fax: [phone]  ---------------------------------  An electronic signature was used to authenticate this note.  TERENCE Kasper   on 7/5/2023 at 11:08 AM

## 2023-07-08 LAB — TSI SER-ACNC: 1.5 IU/L

## 2023-07-10 ENCOUNTER — TELEPHONE (OUTPATIENT)
Dept: NEUROLOGY | Age: 68
End: 2023-07-10

## 2023-07-10 NOTE — TELEPHONE ENCOUNTER
Horrible migraine over the weekend . Shanti Hernandez took the edge off but still having residual headache pain.

## 2023-07-11 ENCOUNTER — TELEPHONE (OUTPATIENT)
Dept: ENDOCRINOLOGY | Age: 68
End: 2023-07-11

## 2023-07-11 NOTE — TELEPHONE ENCOUNTER
----- Message from TERENCE Tinajero - CNS sent at 7/6/2023  7:48 AM EDT -----  Please call pt and inform her that TSH is normal.  Continue methimazole every other day for now.   Will await TSI

## 2023-07-11 NOTE — TELEPHONE ENCOUNTER
----- Message from TERENCE Gage - CNS sent at 7/10/2023  7:55 AM EDT -----  TSI still mildly elevated.   Please have patient continue methimazole 5 mg every other day for now

## 2023-07-12 ENCOUNTER — TELEPHONE (OUTPATIENT)
Dept: NEUROLOGY | Age: 68
End: 2023-07-12

## 2023-07-12 NOTE — TELEPHONE ENCOUNTER
Per Justin Schilling, we could call it in to the pharmacy? The only record would be a note in her chart? Can we do that? Zavzpret -   1 spray (10 mg) in one nostril PRN repeat in 24 hours if needed. Would come 6 to a package per 30 days.

## 2023-07-28 ENCOUNTER — TELEPHONE (OUTPATIENT)
Dept: NEUROLOGY | Age: 68
End: 2023-07-28

## 2023-07-28 NOTE — TELEPHONE ENCOUNTER
Submitted PA request to Cover My Meds for [facility]      [employer] Approved   Case [id number]  06/28/2023 - 07/27/2024 Birth Control Pills Pregnancy And Lactation Text: This medication should be avoided if pregnant and for the first 30 days post-partum.

## 2023-07-31 ENCOUNTER — TELEPHONE (OUTPATIENT)
Dept: NEUROLOGY | Age: 68
End: 2023-07-31

## 2023-07-31 NOTE — TELEPHONE ENCOUNTER
Patient came in, stated that Kelsey told her to come in and  samples of ZavZpret. 2 boxes dispensed per charting regarding pre-auth.

## 2023-08-18 DIAGNOSIS — G50.1 ATYPICAL FACIAL PAIN: ICD-10-CM

## 2023-08-18 RX ORDER — PREGABALIN 200 MG/1
CAPSULE ORAL
Qty: 60 CAPSULE | OUTPATIENT
Start: 2023-08-18

## 2023-08-18 RX ORDER — PREGABALIN 200 MG/1
CAPSULE ORAL
Qty: 60 CAPSULE | Refills: 5 | Status: SHIPPED | OUTPATIENT
Start: 2023-08-18 | End: 2024-02-24

## 2023-08-18 NOTE — TELEPHONE ENCOUNTER
Patient called in stating that she lost her bottle of Lyrica. Spoke with Kelsey at the pharmacy and she recommended that [id number] call her insurance and see if they would do a one time over ride to replace the bottle. Let Helga know to call her insurance. Pharmacy requested refill on her Lyrica.

## 2023-08-23 ENCOUNTER — TELEPHONE (OUTPATIENT)
Dept: NEUROLOGY | Age: 68
End: 2023-08-23

## 2023-12-06 ENCOUNTER — OFFICE VISIT (OUTPATIENT)
Dept: ENDOCRINOLOGY | Age: 68
End: 2023-12-06
Payer: MEDICARE

## 2023-12-06 VITALS
HEART RATE: 79 BPM | WEIGHT: 246 LBS | DIASTOLIC BLOOD PRESSURE: 75 MMHG | HEIGHT: 68 IN | SYSTOLIC BLOOD PRESSURE: 128 MMHG | BODY MASS INDEX: 37.28 KG/M2

## 2023-12-06 DIAGNOSIS — E05.90 HYPERTHYROIDISM: Primary | ICD-10-CM

## 2023-12-06 DIAGNOSIS — E78.2 MIXED HYPERLIPIDEMIA: ICD-10-CM

## 2023-12-06 PROCEDURE — 99214 OFFICE O/P EST MOD 30 MIN: CPT | Performed by: CLINICAL NURSE SPECIALIST

## 2023-12-06 PROCEDURE — 1123F ACP DISCUSS/DSCN MKR DOCD: CPT | Performed by: CLINICAL NURSE SPECIALIST

## 2023-12-06 RX ORDER — METHIMAZOLE 5 MG/1
5 TABLET ORAL DAILY
Qty: 90 TABLET | Refills: 1 | Status: SHIPPED | OUTPATIENT
Start: 2023-12-06

## 2023-12-06 NOTE — PROGRESS NOTES
[facility] Department of Endocrinology Diabetes and Metabolism   [address] [facility] [address] [place] [address], [facility]   Phone: [phone]  Fax: [phone]    Date of Service: 12/6/2023  Primary Care Physician: Ketty Mota MD  Provider: TERENCE Orosco      Reason for the visit:  Hyperthyroidism    History of Present Illness: The history is provided by the patient. No  was used. Accuracy of the patient data is excellent. Tim Hurst is a very pleasant 74 y.o. female seen today for evaluation and management of hyperthyroidism     The patient was diagnosed with hyperthyroidism in 2015 and currently on Methimazole 5 mg daily (Was suppose to take 5 mg every other day)      Lab Results   Component Value Date/Time    TSI 3.69 (H) 10/22/2020 12:32 PM    TPOABS 284.6 (H) 10/22/2020 12:32 PM    THGAB <0.9 10/22/2020 12:32 PM       Lab Results   Component Value Date    TSH 2.060 07/05/2023    N0TJWOG 245.90 (H) 10/22/2020    T4JBODB 10.3 04/23/2021    FT3 3.2 07/05/2023    T4FREE 1.79 (H) 07/05/2023       denied palpations, swelling in the area of the thyroid gland, weight loss, change in appetite, nervousness, anxiety, irritability, tremor, sweating, heat intolerance, changes in bowel habits, muscle weakness or difficulty sleeping. PAST MEDICAL HISTORY   Past Medical History:   Diagnosis Date    GERD (gastroesophageal reflux disease)     History of colonoscopy 2006    neg    Trigeminal neuralgia        PAST SURGICAL HISTORY   Past Surgical History:   Procedure Laterality Date    APPENDECTOMY      1972    BRAIN SURGERY      CHOLECYSTECTOMY      1996    DILATION AND CURETTAGE      9/07    JOINT REPLACEMENT Left     Left hip    TRIGGER FINGER RELEASE      TUBAL LIGATION      1986       SOCIAL HISTORY   Tobacco:   reports that she quit smoking about 20 years ago.  She has never used smokeless tobacco.  Alcohol:   reports no history of alcohol

## 2023-12-08 ENCOUNTER — HOSPITAL ENCOUNTER (OUTPATIENT)
Age: 68
Discharge: HOME | End: 2023-12-08
Payer: MEDICARE

## 2023-12-08 DIAGNOSIS — E05.90 HYPERTHYROIDISM: ICD-10-CM

## 2023-12-08 LAB
T3FREE SERPL-MCNC: 3.2 PG/ML (ref 2–4.4)
T4 FREE SERPL-MCNC: 1.5 NG/DL (ref 0.9–1.7)
TSH SERPL DL<=0.05 MIU/L-ACNC: 1.9 UIU/ML (ref 0.27–4.2)

## 2023-12-08 PROCEDURE — 84439 ASSAY OF FREE THYROXINE: CPT

## 2023-12-08 PROCEDURE — 36415 COLL VENOUS BLD VENIPUNCTURE: CPT

## 2023-12-08 PROCEDURE — 84443 ASSAY THYROID STIM HORMONE: CPT

## 2023-12-08 PROCEDURE — 84481 FREE ASSAY (FT-3): CPT

## 2023-12-12 ENCOUNTER — TELEPHONE (OUTPATIENT)
Dept: ENDOCRINOLOGY | Age: 68
End: 2023-12-12

## 2023-12-12 NOTE — TELEPHONE ENCOUNTER
----- Message from [place], APRN - CNS sent at 12/11/2023  7:52 AM EST -----  Please call patient and inform her thyroid function is normal.  Continue methimazole 5 mg daily

## 2024-02-12 RX ORDER — DOXEPIN HYDROCHLORIDE 10 MG/1
CAPSULE ORAL
Qty: 90 CAPSULE | Refills: 3 | OUTPATIENT
Start: 2024-02-12

## 2024-05-27 DIAGNOSIS — E05.90 HYPERTHYROIDISM: ICD-10-CM

## 2024-05-29 RX ORDER — METHIMAZOLE 5 MG/1
5 TABLET ORAL DAILY
Qty: 90 TABLET | Refills: 1 | Status: SHIPPED | OUTPATIENT
Start: 2024-05-29

## 2024-06-04 ENCOUNTER — OFFICE VISIT (OUTPATIENT)
Dept: ENDOCRINOLOGY | Age: 69
End: 2024-06-04
Payer: MEDICARE

## 2024-06-04 VITALS
WEIGHT: 233 LBS | HEIGHT: 68 IN | OXYGEN SATURATION: 100 % | HEART RATE: 69 BPM | DIASTOLIC BLOOD PRESSURE: 65 MMHG | BODY MASS INDEX: 35.31 KG/M2 | SYSTOLIC BLOOD PRESSURE: 127 MMHG

## 2024-06-04 DIAGNOSIS — E55.9 VITAMIN D DEFICIENCY: ICD-10-CM

## 2024-06-04 DIAGNOSIS — E78.2 MIXED HYPERLIPIDEMIA: ICD-10-CM

## 2024-06-04 DIAGNOSIS — E05.90 HYPERTHYROIDISM: Primary | ICD-10-CM

## 2024-06-04 PROCEDURE — 99214 OFFICE O/P EST MOD 30 MIN: CPT | Performed by: CLINICAL NURSE SPECIALIST

## 2024-06-04 PROCEDURE — 1123F ACP DISCUSS/DSCN MKR DOCD: CPT | Performed by: CLINICAL NURSE SPECIALIST

## 2024-06-04 NOTE — PROGRESS NOTES
[employer]  [facility] Department of Endocrinology Diabetes and Metabolism   [address] [facility], [place], [address]   Phone: [phone]  Fax: [phone]    Date of Service: 6/4/2024  Primary Care Physician: Mario Rivera MD  Provider: TERENCE Quinones      Reason for the visit:  Hyperthyroidism    History of Present Illness:  The history is provided by the patient. No  was used. Accuracy of the patient data is excellent.    Helga Ball is a very pleasant 68 y.o. female seen today for evaluation and management of hyperthyroidism     The patient was diagnosed with hyperthyroidism in 2015 and currently on Methimazole 5 mg daily (Was suppose to take 5 mg every other day)      Lab Results   Component Value Date/Time    TSI 3.69 (H) 10/22/2020 12:32 PM    TPOABS 284.6 (H) 10/22/2020 12:32 PM    THGAB <0.9 10/22/2020 12:32 PM       Lab Results   Component Value Date    TSH 1.90 12/08/2023    J2ZCTCF 245.90 (H) 10/22/2020    S9HCRLS 10.3 04/23/2021    FT3 3.20 12/08/2023    T4FREE 1.5 12/08/2023       denied palpations, swelling in the area of the thyroid gland, weight loss, change in appetite, nervousness, anxiety, irritability, tremor, sweating, heat intolerance, changes in bowel habits, muscle weakness or difficulty sleeping.     PAST MEDICAL HISTORY   Past Medical History:   Diagnosis Date    GERD (gastroesophageal reflux disease)     History of colonoscopy 2006    neg    Trigeminal neuralgia        PAST SURGICAL HISTORY   Past Surgical History:   Procedure Laterality Date    APPENDECTOMY      1972    BRAIN SURGERY      CHOLECYSTECTOMY      1996    DILATION AND CURETTAGE      9/07    FINGER TRIGGER RELEASE      JOINT REPLACEMENT Left     Left hip    TUBAL LIGATION      1986       SOCIAL HISTORY   Tobacco:   reports that she quit smoking about 20 years ago. Her smoking use included cigarettes. She has never used smokeless tobacco.  Alcohol:   reports

## 2024-06-06 ENCOUNTER — HOSPITAL ENCOUNTER (OUTPATIENT)
Age: 69
Discharge: HOME | End: 2024-06-06
Payer: MEDICARE

## 2024-06-06 ENCOUNTER — HOSPITAL ENCOUNTER (EMERGENCY)
Age: 69
Discharge: HOME | End: 2024-06-06
Attending: EMERGENCY MEDICINE
Payer: MEDICARE

## 2024-06-06 VITALS
SYSTOLIC BLOOD PRESSURE: 130 MMHG | OXYGEN SATURATION: 96 % | HEART RATE: 76 BPM | TEMPERATURE: 97.8 F | DIASTOLIC BLOOD PRESSURE: 73 MMHG | RESPIRATION RATE: 16 BRPM

## 2024-06-06 DIAGNOSIS — L60.0 INGROWN TOENAIL: Primary | ICD-10-CM

## 2024-06-06 DIAGNOSIS — E55.9 VITAMIN D DEFICIENCY: ICD-10-CM

## 2024-06-06 DIAGNOSIS — E05.90 HYPERTHYROIDISM: ICD-10-CM

## 2024-06-06 LAB
25(OH)D3 SERPL-MCNC: 31.1 NG/ML (ref 30–100)
T3FREE SERPL-MCNC: 2.59 PG/ML (ref 2–4.4)
T4 FREE SERPL-MCNC: 1.2 NG/DL (ref 0.9–1.7)
TSH SERPL DL<=0.05 MIU/L-ACNC: 2.51 UIU/ML (ref 0.27–4.2)

## 2024-06-06 PROCEDURE — 99282 EMERGENCY DEPT VISIT SF MDM: CPT

## 2024-06-06 PROCEDURE — 84481 FREE ASSAY (FT-3): CPT

## 2024-06-06 PROCEDURE — 84439 ASSAY OF FREE THYROXINE: CPT

## 2024-06-06 PROCEDURE — 36415 COLL VENOUS BLD VENIPUNCTURE: CPT

## 2024-06-06 PROCEDURE — 82306 VITAMIN D 25 HYDROXY: CPT

## 2024-06-06 PROCEDURE — 84443 ASSAY THYROID STIM HORMONE: CPT

## 2024-06-06 ASSESSMENT — PAIN DESCRIPTION - LOCATION: LOCATION: TOE (COMMENT WHICH ONE)

## 2024-06-06 ASSESSMENT — PAIN - FUNCTIONAL ASSESSMENT: PAIN_FUNCTIONAL_ASSESSMENT: 0-10

## 2024-06-06 ASSESSMENT — PAIN DESCRIPTION - ONSET: ONSET: ON-GOING

## 2024-06-06 ASSESSMENT — PAIN DESCRIPTION - FREQUENCY: FREQUENCY: CONTINUOUS

## 2024-06-06 ASSESSMENT — PAIN DESCRIPTION - ORIENTATION: ORIENTATION: RIGHT

## 2024-06-06 ASSESSMENT — PAIN DESCRIPTION - DESCRIPTORS: DESCRIPTORS: ACHING;SORE;TENDER;THROBBING

## 2024-06-06 ASSESSMENT — PAIN DESCRIPTION - PAIN TYPE: TYPE: ACUTE PAIN

## 2024-06-06 ASSESSMENT — LIFESTYLE VARIABLES
HOW MANY STANDARD DRINKS CONTAINING ALCOHOL DO YOU HAVE ON A TYPICAL DAY: PATIENT DOES NOT DRINK
HOW OFTEN DO YOU HAVE A DRINK CONTAINING ALCOHOL: NEVER

## 2024-06-06 ASSESSMENT — PAIN SCALES - GENERAL: PAINLEVEL_OUTOF10: 10

## 2024-06-06 NOTE — ED PROVIDER NOTES
[facility] EMERGENCY DEPARTMENT  EMERGENCY DEPARTMENT ENCOUNTER        Pt Name: Helga Ball  MRN: [id number]  Birthdate 1955  Date of evaluation: 6/6/2024  Provider: Malgorzata Alvarez DO  PCP: Mario Rivera MD  Note Started: 10:22 AM EDT 6/6/24    CHIEF COMPLAINT       Chief Complaint   Patient presents with    Toe Pain     Right great toe pain, NKI        HISTORY OF PRESENT ILLNESS: 1 or more Elements   History From: patient    Limitations to history : None    Helga Ball is a 68 y.o. female who presents with right great toe pain at the distal medial toenail beginning 3 weeks ago. States no injury, redness or swelling.  The complaint has been persistent, moderate in severity, and worsened by nothing.                  Nursing Notes were all reviewed and agreed with or any disagreements were addressed in the HPI.    REVIEW OF SYSTEMS :           Positives and Pertinent negatives as per HPI.     SURGICAL HISTORY     Past Surgical History:   Procedure Laterality Date    APPENDECTOMY      1972    BRAIN SURGERY      CHOLECYSTECTOMY      1996    DILATION AND CURETTAGE      9/07    FINGER TRIGGER RELEASE      JOINT REPLACEMENT Left     Left hip    TUBAL LIGATION      1986       CURRENTMEDICATIONS       Previous Medications    ASPIRIN 81 MG TABLET    Take 1 tablet by mouth daily    ATORVASTATIN (LIPITOR) 40 MG TABLET    Take 1 tablet by mouth daily    BUMETANIDE (BUMEX) 1 MG TABLET    Take 1 tablet by mouth 2 times daily    BUPROPION (WELLBUTRIN SR) 100 MG EXTENDED RELEASE TABLET    Take 1 tablet by mouth daily    BUPROPION (WELLBUTRIN SR) 150 MG EXTENDED RELEASE TABLET        DOXEPIN (SINEQUAN) 10 MG CAPSULE    Take 1 capsule by mouth nightly    DYMISTA 137-50 MCG/ACT SUSP    as needed     MELOXICAM (MOBIC) 15 MG TABLET    take 1 tablet by mouth once daily    METHIMAZOLE (TAPAZOLE) 5 MG TABLET    take 1 tablet by mouth once daily    POTASSIUM CHLORIDE (KLOR-CON M) 20 MEQ

## 2024-06-11 ENCOUNTER — TELEPHONE (OUTPATIENT)
Dept: ENDOCRINOLOGY | Age: 69
End: 2024-06-11

## 2024-06-11 NOTE — TELEPHONE ENCOUNTER
----- Message from TERENCE Quinones - CNS sent at 6/10/2024  8:24 AM EDT -----  Please call pt and inform her that thyroid level are normal.  Continue same

## 2024-09-26 ENCOUNTER — HOSPITAL ENCOUNTER (OUTPATIENT)
Age: 69
Discharge: HOME | End: 2024-09-26
Payer: MEDICARE

## 2024-09-26 DIAGNOSIS — E78.5 HYPERLIPIDEMIA, UNSPECIFIED HYPERLIPIDEMIA TYPE: ICD-10-CM

## 2024-09-26 DIAGNOSIS — G57.93 UNSPECIFIED MONONEUROPATHY OF BILATERAL LOWER LIMBS: ICD-10-CM

## 2024-09-26 DIAGNOSIS — G62.9 PERIPHERAL POLYNEUROPATHY: ICD-10-CM

## 2024-09-26 LAB
FOLATE SERPL-MCNC: 8.2 NG/ML (ref 4.8–24.2)
HCYS SERPL-SCNC: 17.1 UMOL/L (ref 0–15)
TSH SERPL DL<=0.05 MIU/L-ACNC: 1.85 UIU/ML (ref 0.27–4.2)
VIT B12 SERPL-MCNC: 610 PG/ML (ref 211–946)

## 2024-09-26 PROCEDURE — 82607 VITAMIN B-12: CPT

## 2024-09-26 PROCEDURE — 84443 ASSAY THYROID STIM HORMONE: CPT

## 2024-09-26 PROCEDURE — 86039 ANTINUCLEAR ANTIBODIES (ANA): CPT

## 2024-09-26 PROCEDURE — 86036 ANCA SCREEN EACH ANTIBODY: CPT

## 2024-09-26 PROCEDURE — 36415 COLL VENOUS BLD VENIPUNCTURE: CPT

## 2024-09-26 PROCEDURE — 84425 ASSAY OF VITAMIN B-1: CPT

## 2024-09-26 PROCEDURE — 84155 ASSAY OF PROTEIN SERUM: CPT

## 2024-09-26 PROCEDURE — 86334 IMMUNOFIX E-PHORESIS SERUM: CPT

## 2024-09-26 PROCEDURE — 86038 ANTINUCLEAR ANTIBODIES: CPT

## 2024-09-26 PROCEDURE — 83516 IMMUNOASSAY NONANTIBODY: CPT

## 2024-09-26 PROCEDURE — 83090 ASSAY OF HOMOCYSTEINE: CPT

## 2024-09-26 PROCEDURE — 84165 PROTEIN E-PHORESIS SERUM: CPT

## 2024-09-26 PROCEDURE — 84207 ASSAY OF VITAMIN B-6: CPT

## 2024-09-26 PROCEDURE — 82746 ASSAY OF FOLIC ACID SERUM: CPT

## 2024-09-27 LAB
ALBUMIN SERPL-MCNC: 3.2 G/DL (ref 3.5–4.7)
ALPHA1 GLOB SERPL ELPH-MCNC: 0.2 G/DL (ref 0.2–0.4)
ALPHA2 GLOB SERPL ELPH-MCNC: 1 G/DL (ref 0.5–1)
ANA SER QL IA: NEGATIVE
B-GLOBULIN SERPL ELPH-MCNC: 1.3 G/DL (ref 0.8–1.3)
GAMMA GLOB SERPL ELPH-MCNC: 1.3 G/DL (ref 0.7–1.6)
PATHOLOGIST: ABNORMAL
PROT PATTERN SERPL ELPH-IMP: ABNORMAL
PROT SERPL-MCNC: 7 G/DL (ref 6.4–8.3)

## 2024-09-30 LAB
INTERPRETATION SERPL IFE-IMP: NORMAL
PATHOLOGY STUDY: NORMAL

## 2024-10-01 LAB
ANCA AB PATTERN SER IF-IMP: NORMAL
ANCA IGG TITR SER IF: NORMAL {TITER}
DEPRECATED S PNEUM 1 IGG SER-MCNC: 1 AU/ML (ref 0–19)
PYRIDOXAL PHOS SERPL-SCNC: 10.7 NMOL/L (ref 20–125)
SERINE PROTEASE 3, IGG: 8 AU/ML (ref 0–19)

## 2024-10-02 LAB — VIT B1 PYROPHOSHATE BLD-SCNC: 101 NMOL/L (ref 70–180)

## 2024-12-04 ENCOUNTER — OFFICE VISIT (OUTPATIENT)
Dept: ENDOCRINOLOGY | Age: 69
End: 2024-12-04
Payer: MEDICARE

## 2024-12-04 VITALS
HEIGHT: 68 IN | DIASTOLIC BLOOD PRESSURE: 74 MMHG | BODY MASS INDEX: 32.43 KG/M2 | WEIGHT: 214 LBS | SYSTOLIC BLOOD PRESSURE: 135 MMHG | OXYGEN SATURATION: 99 % | HEART RATE: 72 BPM

## 2024-12-04 DIAGNOSIS — E05.90 HYPERTHYROIDISM: Primary | ICD-10-CM

## 2024-12-04 DIAGNOSIS — E55.9 VITAMIN D DEFICIENCY: ICD-10-CM

## 2024-12-04 DIAGNOSIS — E04.2 MULTIPLE THYROID NODULES: ICD-10-CM

## 2024-12-04 DIAGNOSIS — E78.2 MIXED HYPERLIPIDEMIA: ICD-10-CM

## 2024-12-04 PROCEDURE — 99214 OFFICE O/P EST MOD 30 MIN: CPT | Performed by: CLINICAL NURSE SPECIALIST

## 2024-12-04 PROCEDURE — 1123F ACP DISCUSS/DSCN MKR DOCD: CPT | Performed by: CLINICAL NURSE SPECIALIST

## 2024-12-04 PROCEDURE — G2211 COMPLEX E/M VISIT ADD ON: HCPCS | Performed by: CLINICAL NURSE SPECIALIST

## 2024-12-04 RX ORDER — METHIMAZOLE 5 MG/1
TABLET ORAL
Qty: 45 TABLET | Refills: 1 | Status: SHIPPED | OUTPATIENT
Start: 2024-12-04

## 2024-12-04 NOTE — PROGRESS NOTES
[employer]  [facility] Department of Endocrinology Diabetes and Metabolism   [address] [facility], [place], [address]   Phone: [phone]  Fax: [phone]    Date of Service: 12/4/2024  Primary Care Physician: Mario Rivera MD  Provider: TERENCE Quinones      Reason for the visit:  Hyperthyroidism    History of Present Illness:  The history is provided by the patient. No  was used. Accuracy of the patient data is excellent.    Helga Ball is a very pleasant 68 y.o. female seen today for evaluation and management of hyperthyroidism     The patient was diagnosed with hyperthyroidism in 2015 and currently on Methimazole 5 mg daily       Lab Results   Component Value Date/Time    TSI 3.69 (H) 10/22/2020 12:32 PM    TPOABS 284.6 (H) 10/22/2020 12:32 PM    THGAB <0.9 10/22/2020 12:32 PM       Lab Results   Component Value Date    TSH 1.85 09/26/2024    R0WOWZH 245.90 (H) 10/22/2020    FT3 2.59 06/06/2024    T4FREE 1.2 06/06/2024       denied palpations, swelling in the area of the thyroid gland, weight loss, change in appetite, nervousness, anxiety, irritability, tremor, sweating, heat intolerance, changes in bowel habits, muscle weakness or difficulty sleeping.     PAST MEDICAL HISTORY   Past Medical History:   Diagnosis Date    GERD (gastroesophageal reflux disease)     History of colonoscopy 2006    neg    Trigeminal neuralgia        PAST SURGICAL HISTORY   Past Surgical History:   Procedure Laterality Date    APPENDECTOMY      1972    BRAIN SURGERY      CHOLECYSTECTOMY      1996    DILATION AND CURETTAGE      9/07    FINGER TRIGGER RELEASE      JOINT REPLACEMENT Left     Left hip    TUBAL LIGATION      1986       SOCIAL HISTORY   Tobacco:   reports that she quit smoking about 21 years ago. Her smoking use included cigarettes. She has never used smokeless tobacco.  Alcohol:   reports no history of alcohol use.  Drugs:   reports no history of drug

## 2024-12-20 DIAGNOSIS — G50.1 ATYPICAL FACIAL PAIN: ICD-10-CM

## 2024-12-20 RX ORDER — DOXEPIN HYDROCHLORIDE 10 MG/1
10 CAPSULE ORAL NIGHTLY
Qty: 90 CAPSULE | Refills: 3 | Status: SHIPPED | OUTPATIENT
Start: 2024-12-20

## 2024-12-20 RX ORDER — PREGABALIN 200 MG/1
CAPSULE ORAL
Qty: 60 CAPSULE | Refills: 5 | Status: SHIPPED | OUTPATIENT
Start: 2024-12-20 | End: 2025-06-28

## 2024-12-20 NOTE — TELEPHONE ENCOUNTER
Patient tried to call and reschedule her telehealth visit from 12/18 but was unable to reach  us. It was r/s for Feb.     May you refill these meds for her?

## 2025-01-29 ENCOUNTER — HOSPITAL ENCOUNTER (OUTPATIENT)
Age: 70
Discharge: HOME | End: 2025-01-29
Payer: MEDICARE

## 2025-01-29 DIAGNOSIS — E05.90 HYPERTHYROIDISM: ICD-10-CM

## 2025-01-29 LAB
T4 FREE SERPL-MCNC: 1.2 NG/DL (ref 0.9–1.7)
TSH SERPL DL<=0.05 MIU/L-ACNC: 2.04 UIU/ML (ref 0.27–4.2)

## 2025-01-29 PROCEDURE — 84439 ASSAY OF FREE THYROXINE: CPT

## 2025-01-29 PROCEDURE — 84443 ASSAY THYROID STIM HORMONE: CPT

## 2025-01-29 PROCEDURE — 36415 COLL VENOUS BLD VENIPUNCTURE: CPT

## 2025-01-31 ENCOUNTER — TELEPHONE (OUTPATIENT)
Dept: ENDOCRINOLOGY | Age: 70
End: 2025-01-31

## 2025-01-31 NOTE — TELEPHONE ENCOUNTER
----- Message from Seth SCHULTZ sent at 1/30/2025  7:51 AM EST -----  Please call patient and inform her thyroid function is normal.  This is an excellent result.  Continue same

## 2025-02-26 ENCOUNTER — HOSPITAL ENCOUNTER (EMERGENCY)
Age: 70
Discharge: HOME | End: 2025-02-26
Attending: EMERGENCY MEDICINE
Payer: MEDICARE

## 2025-02-26 ENCOUNTER — APPOINTMENT (OUTPATIENT)
Dept: ULTRASOUND IMAGING | Age: 70
End: 2025-02-26
Payer: MEDICARE

## 2025-02-26 ENCOUNTER — APPOINTMENT (OUTPATIENT)
Dept: CT IMAGING | Age: 70
End: 2025-02-26
Payer: MEDICARE

## 2025-02-26 ENCOUNTER — APPOINTMENT (OUTPATIENT)
Dept: GENERAL RADIOLOGY | Age: 70
End: 2025-02-26
Payer: MEDICARE

## 2025-02-26 VITALS
SYSTOLIC BLOOD PRESSURE: 127 MMHG | TEMPERATURE: 97.5 F | WEIGHT: 217 LBS | BODY MASS INDEX: 32.99 KG/M2 | DIASTOLIC BLOOD PRESSURE: 80 MMHG | HEART RATE: 74 BPM | OXYGEN SATURATION: 99 % | RESPIRATION RATE: 18 BRPM

## 2025-02-26 DIAGNOSIS — M25.551 RIGHT HIP PAIN: Primary | ICD-10-CM

## 2025-02-26 DIAGNOSIS — S32.040A CLOSED COMPRESSION FRACTURE OF L4 LUMBAR VERTEBRA, INITIAL ENCOUNTER (HCC): ICD-10-CM

## 2025-02-26 DIAGNOSIS — M17.11 OSTEOARTHRITIS OF RIGHT KNEE, UNSPECIFIED OSTEOARTHRITIS TYPE: ICD-10-CM

## 2025-02-26 DIAGNOSIS — M47.816 OSTEOARTHRITIS OF LUMBAR SPINE, UNSPECIFIED SPINAL OSTEOARTHRITIS COMPLICATION STATUS: ICD-10-CM

## 2025-02-26 PROCEDURE — 93970 EXTREMITY STUDY: CPT

## 2025-02-26 PROCEDURE — 73110 X-RAY EXAM OF WRIST: CPT

## 2025-02-26 PROCEDURE — 99284 EMERGENCY DEPT VISIT MOD MDM: CPT

## 2025-02-26 PROCEDURE — 72131 CT LUMBAR SPINE W/O DYE: CPT

## 2025-02-26 PROCEDURE — 6360000002 HC RX W HCPCS: Performed by: EMERGENCY MEDICINE

## 2025-02-26 PROCEDURE — 73552 X-RAY EXAM OF FEMUR 2/>: CPT

## 2025-02-26 PROCEDURE — 96372 THER/PROPH/DIAG INJ SC/IM: CPT

## 2025-02-26 PROCEDURE — 73502 X-RAY EXAM HIP UNI 2-3 VIEWS: CPT

## 2025-02-26 PROCEDURE — 73080 X-RAY EXAM OF ELBOW: CPT

## 2025-02-26 RX ORDER — KETOROLAC TROMETHAMINE 30 MG/ML
30 INJECTION, SOLUTION INTRAMUSCULAR; INTRAVENOUS ONCE
Status: COMPLETED | OUTPATIENT
Start: 2025-02-26 | End: 2025-02-26

## 2025-02-26 RX ORDER — METHOCARBAMOL 750 MG/1
750 TABLET, FILM COATED ORAL 3 TIMES DAILY PRN
Qty: 12 TABLET | Refills: 0 | Status: SHIPPED | OUTPATIENT
Start: 2025-02-26 | End: 2025-03-02

## 2025-02-26 RX ORDER — KETOROLAC TROMETHAMINE 30 MG/ML
30 INJECTION, SOLUTION INTRAMUSCULAR; INTRAVENOUS ONCE
Status: DISCONTINUED | OUTPATIENT
Start: 2025-02-26 | End: 2025-02-26

## 2025-02-26 RX ORDER — PREDNISONE 50 MG/1
50 TABLET ORAL DAILY
Qty: 5 TABLET | Refills: 0 | Status: SHIPPED | OUTPATIENT
Start: 2025-02-26 | End: 2025-03-03

## 2025-02-26 RX ORDER — HYDROCODONE BITARTRATE AND ACETAMINOPHEN 5; 325 MG/1; MG/1
1 TABLET ORAL EVERY 6 HOURS PRN
Qty: 12 TABLET | Refills: 0 | Status: SHIPPED | OUTPATIENT
Start: 2025-02-26 | End: 2025-03-01

## 2025-02-26 RX ADMIN — KETOROLAC TROMETHAMINE 30 MG: 30 INJECTION, SOLUTION INTRAMUSCULAR at 10:18

## 2025-02-26 ASSESSMENT — ENCOUNTER SYMPTOMS
ABDOMINAL PAIN: 0
BACK PAIN: 1

## 2025-02-26 ASSESSMENT — PAIN SCALES - GENERAL
PAINLEVEL_OUTOF10: 7
PAINLEVEL_OUTOF10: 5

## 2025-02-26 ASSESSMENT — PAIN DESCRIPTION - LOCATION
LOCATION: HIP
LOCATION: HIP

## 2025-02-26 ASSESSMENT — PAIN DESCRIPTION - ORIENTATION
ORIENTATION: RIGHT
ORIENTATION: RIGHT

## 2025-02-26 ASSESSMENT — PAIN DESCRIPTION - DESCRIPTORS
DESCRIPTORS: ACHING;DULL;DISCOMFORT
DESCRIPTORS: ACHING;DISCOMFORT;DULL

## 2025-02-26 ASSESSMENT — PAIN - FUNCTIONAL ASSESSMENT: PAIN_FUNCTIONAL_ASSESSMENT: 0-10

## 2025-02-26 NOTE — DISCHARGE INSTRUCTIONS
Call family doctor tomorrow and schedule a followup appointment to be seen in 2 days    Have your doctor obtain  all results    Vascular duplex lower extremity venous bilateral   Final Result   No evidence of DVT in either lower extremity.         XR ELBOW RIGHT (MIN 3 VIEWS)   Final Result   1. No acute bony abnormality seen in the right wrist, right elbow, pelvis, or   right femur.   2. Degenerative changes seen in the right knee.   3. Hardware identified in the right distal radius with no hardware   complication.   4. Degenerative changes seen in the right 1st carpometacarpal joint.         XR FEMUR RIGHT (MIN 2 VIEWS)   Final Result   1. No acute bony abnormality seen in the right wrist, right elbow, pelvis, or   right femur.   2. Degenerative changes seen in the right knee.   3. Hardware identified in the right distal radius with no hardware   complication.   4. Degenerative changes seen in the right 1st carpometacarpal joint.         XR HIP 2-3 VW W PELVIS RIGHT   Final Result   1. No acute bony abnormality seen in the right wrist, right elbow, pelvis, or   right femur.   2. Degenerative changes seen in the right knee.   3. Hardware identified in the right distal radius with no hardware   complication.   4. Degenerative changes seen in the right 1st carpometacarpal joint.         XR WRIST RIGHT (MIN 3 VIEWS)   Final Result   1. No acute bony abnormality seen in the right wrist, right elbow, pelvis, or   right femur.   2. Degenerative changes seen in the right knee.   3. Hardware identified in the right distal radius with no hardware   complication.   4. Degenerative changes seen in the right 1st carpometacarpal joint.         CT LUMBAR SPINE WO CONTRAST   Final Result   1. Age indeterminate mild superior endplate compression fracture of L4.   2. No acute sacral fracture.   3. Multilevel advanced lumbar spine degenerative disc changes.   4. Mild degenerative anterolisthesis of L4 upon L5.  No evidence of

## 2025-02-26 NOTE — ED PROVIDER NOTES
[facility] EMERGENCY DEPARTMENT  EMERGENCY DEPARTMENT ENCOUNTER        Pt Name: Helga Ball  MRN: [id number]  Birthdate 1955  Date of evaluation: 2/26/2025  Provider: Murali Tsai DO  PCP: Mario Rivera MD  Note Started: 1:29 PM EST 2/26/25    CHIEF COMPLAINT       Chief Complaint   Patient presents with    Hip Pain     RIGHT HIP PAIN, FELL X 2 WEEKS       HISTORY OF PRESENT ILLNESS: 1 or more Elements     History from : Patient    Limitations to history : None    Helga Ball is a 69 y.o. female who presents for fall 2 weeks ago she was pushing a chair down a ramp and she slid down and fell.  She is complaining of right hip and sacral base pain for approximately 2 weeks she also noted she hurt her wrist and elbow she denied any head injury upper back pain neck pain chest pain shortness of breath she denied any left-sided symptoms.    Nursing Notes were all reviewed and agreed with or any disagreements were addressed in the HPI.    REVIEW OF SYSTEMS :      Review of Systems   Cardiovascular:  Negative for chest pain.   Gastrointestinal:  Negative for abdominal pain.   Musculoskeletal:  Positive for back pain. Negative for neck pain.   Neurological:  Negative for dizziness, syncope and weakness.       Positives and Pertinent negatives as per HPI.     SURGICAL HISTORY     Past Surgical History:   Procedure Laterality Date    APPENDECTOMY      1972    BRAIN SURGERY      CHOLECYSTECTOMY      1996    DILATION AND CURETTAGE      9/07    FINGER TRIGGER RELEASE      JOINT REPLACEMENT Left     Left hip    TUBAL LIGATION      1986       CURRENTMEDICATIONS       Discharge Medication List as of 2/26/2025 11:55 AM        CONTINUE these medications which have NOT CHANGED    Details   pregabalin (LYRICA) 200 MG capsule take 1 capsule by mouth twice a day, Disp-60 capsule, R-5Normal      doxepin (SINEQUAN) 10 MG capsule Take 1 capsule by mouth nightly, Disp-90 capsule, R-3Normal

## 2025-02-28 ENCOUNTER — OFFICE VISIT (OUTPATIENT)
Dept: NEUROSURGERY | Age: 70
End: 2025-02-28
Payer: MEDICARE

## 2025-02-28 VITALS
OXYGEN SATURATION: 98 % | HEIGHT: 68 IN | HEART RATE: 72 BPM | DIASTOLIC BLOOD PRESSURE: 82 MMHG | WEIGHT: 217 LBS | BODY MASS INDEX: 32.89 KG/M2 | SYSTOLIC BLOOD PRESSURE: 128 MMHG | RESPIRATION RATE: 16 BRPM

## 2025-02-28 DIAGNOSIS — S32.040A CLOSED COMPRESSION FRACTURE OF L4 LUMBAR VERTEBRA, INITIAL ENCOUNTER (HCC): Primary | ICD-10-CM

## 2025-02-28 PROCEDURE — 99203 OFFICE O/P NEW LOW 30 MIN: CPT | Performed by: PHYSICIAN ASSISTANT

## 2025-02-28 PROCEDURE — 1159F MED LIST DOCD IN RCRD: CPT | Performed by: PHYSICIAN ASSISTANT

## 2025-02-28 PROCEDURE — 1123F ACP DISCUSS/DSCN MKR DOCD: CPT | Performed by: PHYSICIAN ASSISTANT

## 2025-02-28 NOTE — PROGRESS NOTES
and transverse planes of motion and facilitate healing. This will significantly reduce further risk of trauma to the spine and enable patient to be upright and participate in the rehabilitation process.  Ultimately the patient will be able to safely and effectively achieve an increased level of physical activity, leading to an overall improved quality of life.

## 2025-03-03 ENCOUNTER — HOSPITAL ENCOUNTER (OUTPATIENT)
Dept: ULTRASOUND IMAGING | Age: 70
Discharge: HOME | End: 2025-03-05
Payer: MEDICARE

## 2025-03-03 DIAGNOSIS — E04.2 MULTIPLE THYROID NODULES: ICD-10-CM

## 2025-03-03 PROCEDURE — 76536 US EXAM OF HEAD AND NECK: CPT

## 2025-03-05 ENCOUNTER — TELEPHONE (OUTPATIENT)
Dept: NEUROSURGERY | Age: 70
End: 2025-03-05

## 2025-03-05 NOTE — TELEPHONE ENCOUNTER
Patient called and left message stating that she had Bone scan done last year ut did not say where-called and left message asking for patient to call us back and let us know where she had it done.

## 2025-03-06 ENCOUNTER — TELEPHONE (OUTPATIENT)
Dept: ENDOCRINOLOGY | Age: 70
End: 2025-03-06

## 2025-03-06 NOTE — TELEPHONE ENCOUNTER
----- Message from Seth SCHULTZ sent at 3/6/2025 10:30 AM EST -----  Please call patient and inform her I have reviewed thyroid ultrasound.  Very tiny nodules again described.  Will continue to observe

## 2025-03-10 ENCOUNTER — TELEPHONE (OUTPATIENT)
Dept: NEUROSURGERY | Age: 70
End: 2025-03-10

## 2025-03-10 NOTE — TELEPHONE ENCOUNTER
She saw Gudelia. 2-28-25. Patient is having her MRI on 3-22--25 and had dexa scan was done last August 2024/    She says her insurance only covers this once every 2 years.. Could she use the same test and bring in disc for dexa scan after sh gets MRI to schedule  [phone] phone

## 2025-03-22 ENCOUNTER — HOSPITAL ENCOUNTER (OUTPATIENT)
Dept: MRI IMAGING | Age: 70
Discharge: HOME | End: 2025-03-24
Payer: MEDICARE

## 2025-03-22 DIAGNOSIS — S32.040A CLOSED COMPRESSION FRACTURE OF L4 LUMBAR VERTEBRA, INITIAL ENCOUNTER (HCC): ICD-10-CM

## 2025-03-22 PROCEDURE — 72148 MRI LUMBAR SPINE W/O DYE: CPT

## 2025-04-21 ENCOUNTER — TELEPHONE (OUTPATIENT)
Dept: NEUROSURGERY | Age: 70
End: 2025-04-21

## 2025-04-21 DIAGNOSIS — M54.42 CHRONIC BILATERAL LOW BACK PAIN WITH BILATERAL SCIATICA: Primary | ICD-10-CM

## 2025-04-21 DIAGNOSIS — G89.29 CHRONIC BILATERAL LOW BACK PAIN WITH BILATERAL SCIATICA: Primary | ICD-10-CM

## 2025-04-21 DIAGNOSIS — M54.41 CHRONIC BILATERAL LOW BACK PAIN WITH BILATERAL SCIATICA: Primary | ICD-10-CM

## 2025-04-21 DIAGNOSIS — M48.061 SPINAL STENOSIS OF LUMBAR REGION WITHOUT NEUROGENIC CLAUDICATION: ICD-10-CM

## 2025-04-21 NOTE — TELEPHONE ENCOUNTER
Patient was here on 2-28-25. She saw Gudelia. She left a vm. She says she has splintering pain when she went to the bathroom on her lower back running across her right buttocks and her waist as well.  She using is a heating pad. She wants to know if we need to see her for this.  She did her MRI that was ordered too,    [phone]

## 2025-04-21 NOTE — TELEPHONE ENCOUNTER
Patient called, all questions answered. Patient past her 3 month cristofer with fracture and likely healed by now. MRI reviewed and patient does have some stenosis will refer to pain management

## 2025-05-14 ENCOUNTER — HOSPITAL ENCOUNTER (OUTPATIENT)
Age: 70
Discharge: HOME | End: 2025-05-14
Payer: MEDICARE

## 2025-05-14 ENCOUNTER — HOSPITAL ENCOUNTER (OUTPATIENT)
Dept: GENERAL RADIOLOGY | Age: 70
Discharge: HOME | End: 2025-05-16
Payer: MEDICARE

## 2025-05-14 DIAGNOSIS — S39.012A LUMBOSACRAL STRAIN, INITIAL ENCOUNTER: ICD-10-CM

## 2025-05-14 DIAGNOSIS — M54.9 DORSAL PAIN: ICD-10-CM

## 2025-05-14 PROCEDURE — 72100 X-RAY EXAM L-S SPINE 2/3 VWS: CPT

## 2025-05-14 PROCEDURE — 72072 X-RAY EXAM THORAC SPINE 3VWS: CPT

## 2025-07-02 ENCOUNTER — TELEPHONE (OUTPATIENT)
Age: 70
End: 2025-07-02

## 2025-07-02 DIAGNOSIS — G50.1 ATYPICAL FACIAL PAIN: ICD-10-CM

## 2025-07-02 RX ORDER — PREGABALIN 200 MG/1
200 CAPSULE ORAL 2 TIMES DAILY
Qty: 60 CAPSULE | Refills: 2 | Status: SHIPPED | OUTPATIENT
Start: 2025-07-02 | End: 2025-09-30

## 2025-07-02 NOTE — TELEPHONE ENCOUNTER
Last seen2/27/25  Next appt Visit date not found    Requested Prescriptions     Pending Prescriptions Disp Refills    pregabalin (LYRICA) 200 MG capsule [Pharmacy Med Name: PREGABALIN 200MG CAPSULES] 60 capsule      Sig: Take 1 capsule by mouth 2 times daily.      Plan:      Continue Lyrica 200 BID and Doxepin at 10mg daily     Follow with neurosurgery-appointment set up     Continue Ubrelvy as well      Return to office 4 to 6 months     Gurpreet Kwan, APRN - CNS  11:15 AM  2/27/2025

## 2025-08-12 ENCOUNTER — APPOINTMENT (OUTPATIENT)
Dept: ULTRASOUND IMAGING | Age: 70
End: 2025-08-12
Payer: MEDICARE

## 2025-08-12 ENCOUNTER — APPOINTMENT (OUTPATIENT)
Dept: GENERAL RADIOLOGY | Age: 70
End: 2025-08-12
Payer: MEDICARE

## 2025-08-12 ENCOUNTER — HOSPITAL ENCOUNTER (EMERGENCY)
Age: 70
Discharge: HOME | End: 2025-08-12
Attending: EMERGENCY MEDICINE
Payer: MEDICARE

## 2025-08-12 VITALS
DIASTOLIC BLOOD PRESSURE: 100 MMHG | HEIGHT: 68 IN | WEIGHT: 200 LBS | RESPIRATION RATE: 18 BRPM | TEMPERATURE: 97.7 F | SYSTOLIC BLOOD PRESSURE: 127 MMHG | HEART RATE: 66 BPM | OXYGEN SATURATION: 97 % | BODY MASS INDEX: 30.31 KG/M2

## 2025-08-12 DIAGNOSIS — R58 ECCHYMOSIS: Primary | ICD-10-CM

## 2025-08-12 LAB
ALBUMIN SERPL-MCNC: 3.5 G/DL (ref 3.5–5.2)
ALP SERPL-CCNC: 83 U/L (ref 35–104)
ALT SERPL-CCNC: 17 U/L (ref 0–35)
ANION GAP SERPL CALCULATED.3IONS-SCNC: 9 MMOL/L (ref 7–16)
AST SERPL-CCNC: 17 U/L (ref 0–35)
BASOPHILS # BLD: 0.01 K/UL (ref 0–0.2)
BASOPHILS NFR BLD: 0 % (ref 0–2)
BILIRUB SERPL-MCNC: 0.5 MG/DL (ref 0–1.2)
BUN SERPL-MCNC: 10 MG/DL (ref 8–23)
CALCIUM SERPL-MCNC: 8.8 MG/DL (ref 8.8–10.2)
CHLORIDE SERPL-SCNC: 110 MMOL/L (ref 98–107)
CO2 SERPL-SCNC: 23 MMOL/L (ref 22–29)
CREAT SERPL-MCNC: 0.8 MG/DL (ref 0.5–1)
EOSINOPHIL # BLD: 0 K/UL (ref 0.05–0.5)
EOSINOPHILS RELATIVE PERCENT: 0 % (ref 0–6)
ERYTHROCYTE [DISTWIDTH] IN BLOOD BY AUTOMATED COUNT: 17.4 % (ref 11.5–15)
GFR, ESTIMATED: 81 ML/MIN/1.73M2
GLUCOSE SERPL-MCNC: 77 MG/DL (ref 74–99)
HCT VFR BLD AUTO: 37.8 % (ref 34–48)
HGB BLD-MCNC: 12.2 G/DL (ref 11.5–15.5)
IMM GRANULOCYTES # BLD AUTO: 0.03 K/UL (ref 0–0.58)
IMM GRANULOCYTES NFR BLD: 1 % (ref 0–5)
LYMPHOCYTES NFR BLD: 2.03 K/UL (ref 1.5–4)
LYMPHOCYTES RELATIVE PERCENT: 36 % (ref 20–42)
MCH RBC QN AUTO: 27.3 PG (ref 26–35)
MCHC RBC AUTO-ENTMCNC: 32.3 G/DL (ref 32–34.5)
MCV RBC AUTO: 84.6 FL (ref 80–99.9)
MONOCYTES NFR BLD: 0.56 K/UL (ref 0.1–0.95)
MONOCYTES NFR BLD: 10 % (ref 2–12)
NEUTROPHILS NFR BLD: 54 % (ref 43–80)
NEUTS SEG NFR BLD: 3.07 K/UL (ref 1.8–7.3)
PLATELET # BLD AUTO: 241 K/UL (ref 130–450)
PMV BLD AUTO: 11.2 FL (ref 7–12)
POTASSIUM SERPL-SCNC: 4.8 MMOL/L (ref 3.5–5.1)
PROT SERPL-MCNC: 6.8 G/DL (ref 6.4–8.3)
RBC # BLD AUTO: 4.47 M/UL (ref 3.5–5.5)
SODIUM SERPL-SCNC: 142 MMOL/L (ref 136–145)
WBC OTHER # BLD: 5.7 K/UL (ref 4.5–11.5)

## 2025-08-12 PROCEDURE — 85025 COMPLETE CBC W/AUTO DIFF WBC: CPT

## 2025-08-12 PROCEDURE — 73590 X-RAY EXAM OF LOWER LEG: CPT

## 2025-08-12 PROCEDURE — 80053 COMPREHEN METABOLIC PANEL: CPT

## 2025-08-12 PROCEDURE — 99284 EMERGENCY DEPT VISIT MOD MDM: CPT

## 2025-08-12 PROCEDURE — 93971 EXTREMITY STUDY: CPT
